# Patient Record
Sex: MALE | Race: WHITE | NOT HISPANIC OR LATINO | Employment: FULL TIME | URBAN - NONMETROPOLITAN AREA
[De-identification: names, ages, dates, MRNs, and addresses within clinical notes are randomized per-mention and may not be internally consistent; named-entity substitution may affect disease eponyms.]

---

## 2017-03-11 ENCOUNTER — HOSPITAL ENCOUNTER (EMERGENCY)
Facility: HOSPITAL | Age: 67
Discharge: HOME | End: 2017-03-11
Attending: PHYSICIAN ASSISTANT | Admitting: PHYSICIAN ASSISTANT
Payer: COMMERCIAL

## 2017-03-11 VITALS — RESPIRATION RATE: 16 BRPM | TEMPERATURE: 98.8 F | OXYGEN SATURATION: 95 %

## 2017-03-11 DIAGNOSIS — J20.8 ACUTE BRONCHITIS DUE TO OTHER SPECIFIED ORGANISMS: ICD-10-CM

## 2017-03-11 PROCEDURE — 99213 OFFICE O/P EST LOW 20 MIN: CPT | Performed by: PHYSICIAN ASSISTANT

## 2017-03-11 PROCEDURE — 99213 OFFICE O/P EST LOW 20 MIN: CPT

## 2017-03-11 RX ORDER — AZITHROMYCIN 250 MG/1
TABLET, FILM COATED ORAL
Qty: 6 TABLET | Refills: 0 | Status: SHIPPED | OUTPATIENT
Start: 2017-03-11 | End: 2018-01-10

## 2017-03-11 ASSESSMENT — ENCOUNTER SYMPTOMS
COUGH: 1
FATIGUE: 1
NAUSEA: 0
LIGHT-HEADEDNESS: 0
APPETITE CHANGE: 0
VOMITING: 0
PSYCHIATRIC NEGATIVE: 1
VOICE CHANGE: 0
FEVER: 0
HEADACHES: 0
NECK STIFFNESS: 0
ABDOMINAL PAIN: 0
DIZZINESS: 0
DIARRHEA: 0
TROUBLE SWALLOWING: 0
NECK PAIN: 0
SORE THROAT: 0
SINUS PRESSURE: 0
EYE DISCHARGE: 0
CARDIOVASCULAR NEGATIVE: 1
EYE REDNESS: 0

## 2017-03-11 NOTE — ED AVS SNAPSHOT
[facility]    [address] [address]    Phone:  [phone]                                       Dino Guzman   MRN: [id number]    Department:  [facility]   Date of Visit:  3/11/2017           Patient Information     Date Of Birth          1950        Your diagnoses for this visit were:     Acute bronchitis due to other specified organisms Cough 4-6 weeks       You were seen by Gretel Sood PA.      Follow-up Information     Please follow up.    Why:  If symptoms worsen, with a primary care provider        Follow up with [facility].    Specialty:  EMERGENCY MEDICINE    Why:  If further concerns develop    Contact information:    [address] [address] [phone]    Additional information:    From [facility]: Take [address]. Turn left at [address]. Turn left at the first stoplight on [address]. At the first stop sign, take a right onto [address]. Take a left into the parking lot and continue through until you reach the North enterance of the building.       From [place]: Take [address]. Take the [address] ramp towards [place]. Turn left onto [address]. Take a slight right onto [address]. Turn left at the first stoplight on [address]. At the first stop sign, take a right onto [address]. Take a left into the parking lot and continue through until you reach the North enterance of the building.       From [place]: Take [address]. Take a right at [address]. At the first stop sign, take a right onto [address]. Take a left into the parking lot and continue through until you reach the North enterance of the building.       Discharge References/Attachments     BRONCHITIS, ANTIOBIOTIC TREATMENT (ADULT) (ENGLISH)         Review of your medicines      START taking        Dose / Directions Last dose taken    azithromycin 250  "MG tablet   Commonly known as:  ZITHROMAX   Quantity:  6 tablet        2 tabs today then one daily for the next 4 days   Refills:  0                Prescriptions were sent or printed at these locations (1 Prescription)                   [facility] [id number] [place] - [address] AT [facility] [address]   [address] [address]    Telephone:  [phone]   Fax:  [phone]   Hours:                  E-Prescribed (1 of 1)         azithromycin (ZITHROMAX) 250 MG tablet                Orders Needing Specimen Collection     None      Pending Results     No orders found from 3/9/2017 to 3/12/2017.            Pending Culture Results     No orders found from 3/9/2017 to 3/12/2017.            Thank you for choosing [facility]       Thank you for choosing [facility] for your care. Our goal is always to provide you with excellent care. Hearing back from our patients is one way we can continue to improve our services. Please take a few minutes to complete the written survey that you may receive in the mail after you visit with us. Thank you!        [employer] Information     [employer] lets you send messages to your doctor, view your test results, renew your prescriptions, schedule appointments and more. To sign up, go to [email or web address] . Click on \"Log in\" on the left side of the screen, which will take you to the Welcome page. Then click on \"Sign up Now\" on the right side of the page.     You will be asked to enter the access code listed below, as well as some personal information. Please follow the directions to create your username and password.     Your access code is: [email or web address]  Expires: 2017 10:31 AM     Your access code will  in 90 days. If you need help or a new code, please call your [facility] clinic or [phone].        Care EveryWhere ID     This is your Care EveryWhere ID. This could be used by other organizations to access your [facility] medical records  [id number]   "      After Visit Summary       This is your record. Keep this with you and show to your community pharmacist(s) and doctor(s) at your next visit.

## 2017-03-11 NOTE — ED AVS SNAPSHOT
[facility]    [address] [place] [address]    Phone:  [phone]                                       Dino Guzman   MRN: [id number]    Department:  [facility]   Date of Visit:  3/11/2017           After Visit Summary Signature Page     I have received my discharge instructions, and my questions have been answered. I have discussed any challenges I see with this plan with the nurse or doctor.    ..........................................................................................................................................  Patient/Patient Representative Signature      ..........................................................................................................................................  Patient Representative Print Name and Relationship to Patient    ..................................................               ................................................  Date                                            Time    ..........................................................................................................................................  Reviewed by Signature/Title    ...................................................              ..............................................  Date                                                            Time

## 2017-03-11 NOTE — ED PROVIDER NOTES
History     Chief Complaint   Patient presents with     Cough     started 3-4 days ago, some runny nose     The history is provided by the patient. No  was used.     Dino Guzman is a 66 year old male who has cough/congestion x 3-4 weeks. Has some decreased energy. Denies n/v/d/f/c. No ear pain. No sinus pain/pressure. No sore throat    Allergies as of 03/11/2017     (No Known Allergies)     Discharge Medication List as of 3/11/2017 10:31 AM      START taking these medications    Details   azithromycin (ZITHROMAX) 250 MG tablet 2 tabs today then one daily for the next 4 days, Disp-6 tablet, R-0, E-Prescribe             PMH: not pertinent  PSH: not pertinent  FHX: not pertinent        Social History     Social History     Marital status:      Spouse name: N/A     Number of children: N/A     Years of education: N/A     Social History Main Topics     Smoking status: None     Smokeless tobacco: None     Alcohol use None     Drug use: None     Sexual activity: Not Asked     Other Topics Concern     None     Social History Narrative     None         I have reviewed the Medications, Allergies, Past Medical and Surgical History, and Social History in the Epic system.    Review of Systems   Constitutional: Positive for fatigue. Negative for appetite change and fever.   HENT: Positive for congestion. Negative for ear pain, sinus pressure, sore throat, trouble swallowing and voice change.    Eyes: Negative for discharge and redness.   Respiratory: Positive for cough.    Cardiovascular: Negative.    Gastrointestinal: Negative for abdominal pain, diarrhea, nausea and vomiting.   Genitourinary: Negative.    Musculoskeletal: Negative for neck pain and neck stiffness.   Skin: Negative for rash.   Neurological: Negative for dizziness, light-headedness and headaches.   Psychiatric/Behavioral: Negative.        Physical Exam   Heart Rate: 96  Temp: 98.8  F (37.1  C)  Resp: 16  SpO2: 95 %  Physical Exam    Constitutional: He is oriented to person, place, and time. He appears well-developed and well-nourished. No distress.   HENT:   Head: Normocephalic and atraumatic.   Right Ear: External ear normal.   Left Ear: External ear normal.   Mouth/Throat: Oropharynx is clear and moist.   Bilateral TMs/canals clear/wnl  No sinus TTP     Eyes: Conjunctivae and EOM are normal. Right eye exhibits no discharge. Left eye exhibits no discharge.   Neck: Normal range of motion. Neck supple.   Cardiovascular: Normal rate, regular rhythm and normal heart sounds.    Pulmonary/Chest: Effort normal. No respiratory distress.   Abdominal: Soft. Bowel sounds are normal. He exhibits no distension. There is no tenderness.   Neurological: He is alert and oriented to person, place, and time.   Skin: Skin is warm and dry. No rash noted. He is not diaphoretic.   Psychiatric: He has a normal mood and affect.   Nursing note and vitals reviewed.      ED Course     ED Course     Procedures          Assessments & Plan (with Medical Decision Making)     I have reviewed the nursing notes.    I have reviewed the findings, diagnosis, plan and need for follow up with the patient.    Discharge Medication List as of 3/11/2017 10:31 AM      START taking these medications    Details   azithromycin (ZITHROMAX) 250 MG tablet 2 tabs today then one daily for the next 4 days, Disp-6 tablet, R-0, E-Prescribe         Due to length og s/s I chose to tx    Final diagnoses:   Acute bronchitis due to other specified organisms - Cough 4-6 weeks           Patient verbally educated and given appropriate education sheets for each of the diagnoses and has no questions.  Take OTC motrin or tylenol as directed on the bottle as needed.  Take prescription medications as directed.  Increase fluids, wash hands often.  Sleep in a recliner or with multiple pillows until this has resolved.  Follow up with your provider if symptoms increase or if concerns develop, return to the  CHRISTELLE Sood Certified   Physician Assistant  3/11/2017  3:24 PM  URGENT CARE CLINIC    3/11/2017   [facility]     Gretel Sood PA  03/11/17 1528

## 2018-01-10 ENCOUNTER — HOSPITAL ENCOUNTER (EMERGENCY)
Facility: HOSPITAL | Age: 68
Discharge: HOME | End: 2018-01-10
Attending: NURSE PRACTITIONER | Admitting: NURSE PRACTITIONER
Payer: COMMERCIAL

## 2018-01-10 ENCOUNTER — APPOINTMENT (OUTPATIENT)
Dept: GENERAL RADIOLOGY | Facility: HOSPITAL | Age: 68
End: 2018-01-10
Attending: NURSE PRACTITIONER
Payer: COMMERCIAL

## 2018-01-10 VITALS
OXYGEN SATURATION: 97 % | TEMPERATURE: 97.5 F | HEIGHT: 67 IN | RESPIRATION RATE: 16 BRPM | SYSTOLIC BLOOD PRESSURE: 158 MMHG | DIASTOLIC BLOOD PRESSURE: 80 MMHG

## 2018-01-10 DIAGNOSIS — M54.41 ACUTE RIGHT-SIDED LOW BACK PAIN WITH RIGHT-SIDED SCIATICA: ICD-10-CM

## 2018-01-10 PROCEDURE — 72100 X-RAY EXAM L-S SPINE 2/3 VWS: CPT | Mod: TC

## 2018-01-10 PROCEDURE — 96372 THER/PROPH/DIAG INJ SC/IM: CPT

## 2018-01-10 PROCEDURE — 25000128 H RX IP 250 OP 636: Performed by: NURSE PRACTITIONER

## 2018-01-10 PROCEDURE — G0463 HOSPITAL OUTPT CLINIC VISIT: HCPCS | Mod: 25

## 2018-01-10 PROCEDURE — 99213 OFFICE O/P EST LOW 20 MIN: CPT | Performed by: NURSE PRACTITIONER

## 2018-01-10 RX ORDER — PREDNISONE 20 MG/1
TABLET ORAL
Qty: 10 TABLET | Refills: 0 | Status: SHIPPED | OUTPATIENT
Start: 2018-01-10 | End: 2018-02-16

## 2018-01-10 RX ORDER — KETOROLAC TROMETHAMINE 30 MG/ML
30 INJECTION, SOLUTION INTRAMUSCULAR; INTRAVENOUS ONCE
Status: COMPLETED | OUTPATIENT
Start: 2018-01-10 | End: 2018-01-10

## 2018-01-10 RX ORDER — CYCLOBENZAPRINE HCL 10 MG
10 TABLET ORAL 3 TIMES DAILY PRN
Qty: 15 TABLET | Refills: 0 | Status: SHIPPED | OUTPATIENT
Start: 2018-01-10 | End: 2018-02-16

## 2018-01-10 RX ORDER — DEXAMETHASONE SODIUM PHOSPHATE 10 MG/ML
10 INJECTION, SOLUTION INTRAMUSCULAR; INTRAVENOUS ONCE
Status: COMPLETED | OUTPATIENT
Start: 2018-01-10 | End: 2018-01-10

## 2018-01-10 RX ORDER — TRAMADOL HYDROCHLORIDE 50 MG/1
50 TABLET ORAL EVERY 6 HOURS PRN
Qty: 10 TABLET | Refills: 0 | Status: SHIPPED | OUTPATIENT
Start: 2018-01-10 | End: 2018-02-16

## 2018-01-10 RX ADMIN — KETOROLAC TROMETHAMINE 30 MG: 30 INJECTION, SOLUTION INTRAMUSCULAR at 12:26

## 2018-01-10 RX ADMIN — DEXAMETHASONE SODIUM PHOSPHATE 10 MG: 10 INJECTION, SOLUTION INTRAMUSCULAR; INTRAVENOUS at 12:28

## 2018-01-10 ASSESSMENT — ENCOUNTER SYMPTOMS
VOMITING: 0
ABDOMINAL PAIN: 0
ACTIVITY CHANGE: 1
TROUBLE SWALLOWING: 0
FEVER: 0
CHILLS: 0
APPETITE CHANGE: 0
NUMBNESS: 0
PSYCHIATRIC NEGATIVE: 1
DYSURIA: 0
COUGH: 0
DIARRHEA: 0
NAUSEA: 0
BACK PAIN: 1
WEAKNESS: 0

## 2018-01-10 NOTE — ED AVS SNAPSHOT
[facility]    [address] [address]    Phone:  [phone]                                       Dino Guzman   MRN: [id number]    Department:  [facility]   Date of Visit:  1/10/2018           Patient Information     Date Of Birth          1950        Your diagnoses for this visit were:     Acute right-sided low back pain with right-sided sciatica        You were seen by Lissy Preston NP.      Follow-up Information     Follow up with [facility].    Specialty:  EMERGENCY MEDICINE    Why:  As needed, If symptoms worsen    Contact information:    [address] [place] [address] [phone]    Additional information:    From [place]: Take [address]. Turn left at [address]. Turn left at the first stoplight on [address] [place] [address]. At the first stop sign, take a right onto [address]. Take a left into the parking lot and continue through until you reach the North enterance of the building.       From [place]: Take [address]. Take the [address] ramp towards [place]. Turn left onto [address]. Take a slight right onto [address]. Turn left at the first stoplight on [address]. At the first stop sign, take a right onto [address]. Take a left into the parking lot and continue through until you reach the North enterance of the building.       From [place]: Take [address]. Take a right at [address]. At the first stop sign, take a right onto [address]. Take a left into the parking lot and continue through until you reach the North enterance of the building.         Discharge Instructions       Take prednisone as directed. Start tomorrow and take in the morning. Do not take NSAIDS (ibuprofen, aspirin, etc..) while taking.   Take Tramadol for pain not helped by tylenol. Do not drive or participate in activities that require alertness when  taking.   Take Flexeril as needed as directed for muscle spasms. Do not drive or participate in activities that require alertness when taking.   Apply ice to right side of back for 20 minutes every 1-2 hours. Protect skin.   Return to urgent care or emergency department with any increase in symptoms or concerns.     Discharge References/Attachments     BACK PAIN W/ SCIATICA (ENGLISH)         Review of your medicines      START taking        Dose / Directions Last dose taken    cyclobenzaprine 10 MG tablet   Commonly known as:  FLEXERIL   Dose:  10 mg   Quantity:  15 tablet        Take 1 tablet (10 mg) by mouth 3 times daily as needed for muscle spasms   Refills:  0        predniSONE 20 MG tablet   Commonly known as:  DELTASONE   Quantity:  10 tablet        Take two tablets (= 40mg) each day for 5 (five) days   Refills:  0        traMADol 50 MG tablet   Commonly known as:  ULTRAM   Dose:  50 mg   Quantity:  10 tablet        Take 1 tablet (50 mg) by mouth every 6 hours as needed for pain maximum 4 tablet(s) per day   Refills:  0          Our records show that you are taking the medicines listed below. If these are incorrect, please call your family doctor or clinic.        Dose / Directions Last dose taken    TYLENOL PO   Dose:  1500 mg        Take 1,500 mg by mouth   Refills:  0                Prescriptions were sent or printed at these locations (3 Prescriptions)                   [facility] [address] [facility] [address] AT [facility] [address]   [address] [facility] [address]    Telephone:  [phone]   Fax:  [phone]   Hours:                  E-Prescribed (2 of 3)         predniSONE (DELTASONE) 20 MG tablet               cyclobenzaprine (FLEXERIL) 10 MG tablet                 Printed at Department/Unit printer (1 of 3)         traMADol (ULTRAM) 50 MG tablet                Procedures and tests performed during your visit     Lumbar spine XR, 2-3 views      Orders Needing Specimen  "Collection     None      Pending Results     No orders found from 2018 to 2018.            Pending Culture Results     No orders found from 2018 to 2018.            Thank you for choosing [facility]       Thank you for choosing [facility] for your care. Our goal is always to provide you with excellent care. Hearing back from our patients is one way we can continue to improve our services. Please take a few minutes to complete the written survey that you may receive in the mail after you visit with us. Thank you!        [employer] Information     [employer] lets you send messages to your doctor, view your test results, renew your prescriptions, schedule appointments and more. To sign up, go to [email or web address] . Click on \"Log in\" on the left side of the screen, which will take you to the Welcome page. Then click on \"Sign up Now\" on the right side of the page.     You will be asked to enter the access code listed below, as well as some personal information. Please follow the directions to create your username and password.     Your access code is: [email or web address]  Expires: 4/10/2018 12:32 PM     Your access code will  in 90 days. If you need help or a new code, please call your [facility] clinic or [phone].        Care EveryWhere ID     This is your Care EveryWhere ID. This could be used by other organizations to access your [facility] medical records  [id number]        Equal Access to Services     CHRISS NICHOLSON : Hadii layton barneso Soneel, waaxda luqadaha, qaybta kaalmada adeegyada, abner coates . So [facility] [phone].    ATENCIÓN: Si habla español, tiene a rodriguez disposición servicios gratuitos de asistencia lingüística. Llame al [phone].    We comply with applicable federal civil rights laws and [place] laws. We do not discriminate on the basis of race, color, national origin, age, disability, sex, sexual orientation, or gender identity.            After Visit " Summary       This is your record. Keep this with you and show to your community pharmacist(s) and doctor(s) at your next visit.

## 2018-01-10 NOTE — DISCHARGE INSTRUCTIONS
Take prednisone as directed. Start tomorrow and take in the morning. Do not take NSAIDS (ibuprofen, aspirin, etc..) while taking.   Take Tramadol for pain not helped by tylenol. Do not drive or participate in activities that require alertness when taking.   Take Flexeril as needed as directed for muscle spasms. Do not drive or participate in activities that require alertness when taking.   Apply ice to right side of back for 20 minutes every 1-2 hours. Protect skin.   Return to urgent care or emergency department with any increase in symptoms or concerns.

## 2018-01-10 NOTE — ED AVS SNAPSHOT
[facility]    [address] [place] [address]    Phone:  [phone]                                       Dino Guzman   MRN: [id number]    Department[facility]  [facility]   Date of Visit:  1/10/2018           After Visit Summary Signature Page     I have received my discharge instructions, and my questions have been answered. I have discussed any challenges I see with this plan with the nurse or doctor.    ..........................................................................................................................................  Patient/Patient Representative Signature      ..........................................................................................................................................  Patient Representative Print Name and Relationship to Patient    ..................................................               ................................................  Date                                            Time    ..........................................................................................................................................  Reviewed by Signature/Title    ...................................................              ..............................................  Date                                                            Time

## 2018-01-10 NOTE — ED NOTES
Pt presents today alone for c/o low back pain that radiates down the right leg, for about a week.

## 2018-01-10 NOTE — ED PROVIDER NOTES
History     Chief Complaint   Patient presents with     Back Pain     For 10 days, becoming worse. Radiates down right leg to calf area.     The history is provided by the patient. No  was used.     Dino Guzman is a 67 year old male who presents with right sided back pain with radiation to right thigh that started 10 days. He fell 2-3 weeks ago and isn't sure if that caused the flare up. He's had sciatica pain prior and his symptoms feel similar. He wears insoles in his shoes and when he took them out, he noticed an increase in pain. He's taken tylenol without effectiveness. Denies fever, chills, or night sweats. Eating and drinking well. Bowel and bladder are working well. Denies loss of bowel or bladder control. Denies saddle parathesis. Denies diabetes.       Problem List:    There are no active problems to display for this patient.       Past Medical History:    History reviewed. No pertinent past medical history.    Past Surgical History:    History reviewed. No pertinent surgical history.    Family History:    No family history on file.    Social History:  Marital Status:   [2]  Social History   Substance Use Topics     Smoking status: Not on file     Smokeless tobacco: Not on file     Alcohol use Not on file        Medications:      Acetaminophen (TYLENOL PO)   predniSONE (DELTASONE) 20 MG tablet   cyclobenzaprine (FLEXERIL) 10 MG tablet   traMADol (ULTRAM) 50 MG tablet         Review of Systems   Constitutional: Positive for activity change. Negative for appetite change, chills and fever.   HENT: Negative for congestion and trouble swallowing.    Respiratory: Negative for cough.    Cardiovascular: Negative for chest pain and leg swelling.   Gastrointestinal: Negative for abdominal pain, diarrhea, nausea and vomiting.   Genitourinary: Negative for dysuria.   Musculoskeletal: Positive for back pain.        Low back pain with radiation to right thigh.    Skin: Negative for  "rash.   Neurological: Negative for weakness and numbness.   Psychiatric/Behavioral: Negative.        Physical Exam   BP: (!) 198/100  Heart Rate: 106  Temp: 97.5  F (36.4  C)  Resp: 16  Height: 170.2 cm (5' 7\")  SpO2: 97 %    Manual /80 when roomed urgent care.     Physical Exam   Constitutional: He is oriented to person, place, and time. He appears well-developed and well-nourished. No distress.   HENT:   Head: Normocephalic.   Mouth/Throat: Oropharynx is clear and moist.   Neck: Normal range of motion. Neck supple.   Cardiovascular: Normal rate, regular rhythm, normal heart sounds and intact distal pulses.    No murmur heard.  Pulmonary/Chest: Effort normal. No respiratory distress. He has no wheezes. He has no rales.   Abdominal: Soft. He exhibits no distension.   Musculoskeletal: He exhibits tenderness. He exhibits no edema or deformity.   CMS and ROM intact to bilateral lower extremities. Distal pulses intact. NO step offs or TTP to spinal column. Pain is reproducible at right lateral S1 region. Right straight leg intact.    Lymphadenopathy:     He has no cervical adenopathy.   Neurological: He is alert and oriented to person, place, and time. He displays normal reflexes. He exhibits normal muscle tone. Coordination normal.   Skin: Skin is warm and dry. No rash noted. He is not diaphoretic. No erythema.   No rash, erythema, bruising, or warmth to touch to posterior back.    Psychiatric: He has a normal mood and affect. His behavior is normal.   Nursing note and vitals reviewed.      ED Course     ED Course     Procedures    I personally reviewed the x-rays and there is NO fracture or dislocation. Radiology review pending and nurse will notify patient if there is any change in the treatment plan.    Results for orders placed or performed during the hospital encounter of 01/10/18   Lumbar spine XR, 2-3 views    Narrative    XR LUMBAR SPINE 2-3 VIEWS    HISTORY: Pain; , .    TECHNIQUE: 3 views of the " lumbosacral spine.    COMPARISON: None.    FINDINGS:    Lumbar vertebral body heights are preserved. Lumbar lordosis is  straightened. There are bulky ventral lateral osteophytes at multiple  levels, particularly L1-2 and L2-3. Mild diffuse degenerative disc  height loss is present. There is degenerative facet disease, moderate  to advanced at L4-5 and L5-S1. There is right greater than left SI  joint degeneration.        Impression    IMPRESSION:     Multifocal degenerative changes without evidence of acute fracture.    YASMIN WALLACE MD     Medications   ketorolac (TORADOL) injection 30 mg (30 mg Intramuscular Given 1/10/18 1226)   dexamethasone (DECADRON) injection 10 mg (10 mg Intramuscular Given 1/10/18 1228)     Monitored for 20 minutes and tolerated well.     Assessments & Plan (with Medical Decision Making)     Discussed plan of care. He verbalized understanding. All questions answered.     I have reviewed the nursing notes.    I have reviewed the findings, diagnosis, plan and need for follow up with the patient.  Discharged in stable condition.     New Prescriptions    CYCLOBENZAPRINE (FLEXERIL) 10 MG TABLET    Take 1 tablet (10 mg) by mouth 3 times daily as needed for muscle spasms    PREDNISONE (DELTASONE) 20 MG TABLET    Take two tablets (= 40mg) each day for 5 (five) days    TRAMADOL (ULTRAM) 50 MG TABLET    Take 1 tablet (50 mg) by mouth every 6 hours as needed for pain maximum 4 tablet(s) per day       Final diagnoses:   Acute right-sided low back pain with right-sided sciatica     Take prednisone as directed. Start tomorrow and take in the morning. Do not take NSAIDS (ibuprofen, aspirin, etc..) while taking.   Take Tramadol for pain not helped by tylenol. Do not drive or participate in activities that require alertness when taking.   Take Flexeril as needed as directed for muscle spasms. Do not drive or participate in activities that require alertness when taking.   Apply ice to right side of  back for 20 minutes every 1-2 hours. Protect skin.   Return to urgent care or emergency department with any increase in symptoms or concerns.     JEAN-PAUL Fabian  1/10/2018  11:36 AM  URGENT CARE CLINIC       Lissy Preston NP  01/10/18 0093

## 2018-02-16 ENCOUNTER — HOSPITAL ENCOUNTER (EMERGENCY)
Facility: HOSPITAL | Age: 68
Discharge: HOME | End: 2018-02-16
Attending: NURSE PRACTITIONER | Admitting: NURSE PRACTITIONER
Payer: COMMERCIAL

## 2018-02-16 VITALS
DIASTOLIC BLOOD PRESSURE: 59 MMHG | WEIGHT: 270 LBS | HEART RATE: 97 BPM | HEIGHT: 67 IN | OXYGEN SATURATION: 93 % | RESPIRATION RATE: 20 BRPM | BODY MASS INDEX: 42.38 KG/M2 | SYSTOLIC BLOOD PRESSURE: 168 MMHG | TEMPERATURE: 97.8 F

## 2018-02-16 DIAGNOSIS — B02.23 SHINGLES (HERPES ZOSTER) POLYNEUROPATHY: ICD-10-CM

## 2018-02-16 PROCEDURE — G0463 HOSPITAL OUTPT CLINIC VISIT: HCPCS

## 2018-02-16 PROCEDURE — 99213 OFFICE O/P EST LOW 20 MIN: CPT | Performed by: NURSE PRACTITIONER

## 2018-02-16 RX ORDER — VALACYCLOVIR HYDROCHLORIDE 1 G/1
1000 TABLET, FILM COATED ORAL 3 TIMES DAILY
Qty: 21 TABLET | Refills: 0 | Status: SHIPPED | OUTPATIENT
Start: 2018-02-16 | End: 2020-03-16

## 2018-02-16 NOTE — ED PROVIDER NOTES
"  History     Chief Complaint   Patient presents with     Rash     Possibly shingles type on Chest, closed and dry X 5 days     The history is provided by the patient. No  was used.     Dino Guzman is a 67 year old male who presents with a rash to the left side of his chest that started 5 days ago. Rash has a burning characteristic. No interventions for symptoms. Denies fever, chills, or night sweats. Eating and drinking well. Bowel and bladder are working well.  He had chicken pox as a child. He hasn't received Zoster vaccine.     He's had shingles 1 time.     Problem List:    There are no active problems to display for this patient.       Past Medical History:    History reviewed. No pertinent past medical history.    Past Surgical History:    History reviewed. No pertinent surgical history.    Family History:    No family history on file.    Social History:  Marital Status:   [2]  Social History   Substance Use Topics     Smoking status: Never Smoker     Smokeless tobacco: Never Used     Alcohol use Not on file        Medications:      valACYclovir (VALTREX) 1000 mg tablet   Acetaminophen (TYLENOL PO)         Review of Systems   Constitutional: Negative for activity change, appetite change, chills, fatigue and fever.   HENT: Negative for congestion and trouble swallowing.    Respiratory: Negative for cough, shortness of breath and stridor.    Gastrointestinal: Negative for abdominal pain, diarrhea, nausea and vomiting.   Genitourinary: Negative for dysuria.   Skin: Positive for rash.        Rash to left side of chest.    Neurological: Negative for weakness and numbness.   Psychiatric/Behavioral: Negative.        Physical Exam   BP: 168/59  Pulse: 97  Temp: 97.8  F (36.6  C)  Resp: 20  Height: 170.2 cm (5' 7\")  Weight: 122.5 kg (270 lb)  SpO2: 93 %      Physical Exam   Constitutional: He is oriented to person, place, and time. He appears well-developed and well-nourished. No " distress.   HENT:   Head: Normocephalic.   Right Ear: External ear normal.   Left Ear: External ear normal.   Mouth/Throat: Oropharynx is clear and moist.   Neck: Normal range of motion. Neck supple.   Cardiovascular: Normal rate, regular rhythm and normal heart sounds.    No murmur heard.  Pulmonary/Chest: Effort normal. No respiratory distress. He has no wheezes. He has no rales.   Abdominal: Soft. He exhibits no distension.   Musculoskeletal: Normal range of motion.   Lymphadenopathy:     He has no cervical adenopathy.   Neurological: He is alert and oriented to person, place, and time. He exhibits normal muscle tone.   Skin: Skin is warm and dry. Rash noted. He is not diaphoretic.   Diffuse clustered vesicular rash to left T3-T4 region on anterior and posterior. No drainage from rash. Rash does not cross midline.    Psychiatric: He has a normal mood and affect. His behavior is normal.   Nursing note and vitals reviewed.      ED Course     ED Course     Procedures      Assessments & Plan (with Medical Decision Making)     Discussed plan of care. He verbalized understanding. All questions answered.     I have reviewed the nursing notes.    I have reviewed the findings, diagnosis, plan and need for follow up with the patient.  Discharged in stable condition.     New Prescriptions    VALACYCLOVIR (VALTREX) 1000 MG TABLET    Take 1 tablet (1,000 mg) by mouth 3 times daily for 7 days       Final diagnoses:   Shingles (herpes zoster) polyneuropathy     Take Valtrex as ordered.   Take tylenol and/or ibuprofen for pain. Follow dosing on package.   You can apply a cooling lotion like Vanicream to rash if symptoms are present.   Follow up with PCP with any increase in symptoms or concerns.   Return to urgent care or emergency department with any increase in symptoms or concerns.     JEAN-PAUL Fabian  2/16/2018  4:22 PM  [facility]       Lissy Preston NP  02/18/18 8509

## 2018-02-16 NOTE — ED AVS SNAPSHOT
[facility]    [address] [address]    Phone:  [phone]                                       Dino Guzman   MRN: [id number]    Department:  [facility]   Date of Visit:  2/16/2018           Patient Information     Date Of Birth          1950        Your diagnoses for this visit were:     Shingles (herpes zoster) polyneuropathy        You were seen by Lissy Preston NP.      Follow-up Information     Follow up with [facility].    Specialty:  EMERGENCY MEDICINE    Why:  As needed, If symptoms worsen    Contact information:    [address] [address] [phone]    Additional information:    From [place]: Take [address]. Turn left at [address]. Turn left at the first stoplight on [address]. At the first stop sign, take a right onto [address]. Take a left into the parking lot and continue through until you reach the North enterance of the building.       From [place]: Take [address]. Take the [address] ramp towards [place]. Turn left onto [address]. Take a slight right onto [address]. Turn left at the first stoplight on [address]. At the first stop sign, take a right onto [address]. Take a left into the parking lot and continue through until you reach the North enterance of the building.       From [place]: Take [address]. Take a right at [address]. At the first stop sign, take a right onto [address]. Take a left into the parking lot and continue through until you reach the North enterance of the building.         Discharge Instructions       Take Valtrex as ordered.   Take tylenol and/or ibuprofen for pain. Follow dosing on package.   You can apply a cooling lotion like Vanicream to rash if symptoms are present.   Follow up with PCP with any increase in symptoms or concerns.   Return to urgent care or  "emergency department with any increase in symptoms or concerns.     Discharge References/Attachments     SHINGLES (HERPES ZOSTER) (ENGLISH)         Review of your medicines      START taking        Dose / Directions Last dose taken    valACYclovir 1000 mg tablet   Commonly known as:  VALTREX   Dose:  1000 mg   Quantity:  21 tablet        Take 1 tablet (1,000 mg) by mouth 3 times daily for 7 days   Refills:  0          Our records show that you are taking the medicines listed below. If these are incorrect, please call your family doctor or clinic.        Dose / Directions Last dose taken    TYLENOL PO   Dose:  1500 mg        Take 1,500 mg by mouth   Refills:  0                Prescriptions were sent or printed at these locations (1 Prescription)                   [id number] - [place], [place] - [address] AT [facility] [address]   [address], [place] [address]    Telephone:  [phone]   Fax:  [phone]   Hours:                  E-Prescribed (1 of 1)         valACYclovir (VALTREX) 1000 mg tablet                Orders Needing Specimen Collection     None      Pending Results     No orders found from 2/14/2018 to 2/17/2018.            Pending Culture Results     No orders found from 2/14/2018 to 2/17/2018.            Thank you for choosing [facility]       Thank you for choosing [facility] for your care. Our goal is always to provide you with excellent care. Hearing back from our patients is one way we can continue to improve our services. Please take a few minutes to complete the written survey that you may receive in the mail after you visit with us. Thank you!        [facility] Information     [facility] lets you send messages to your doctor, view your test results, renew your prescriptions, schedule appointments and more. To sign up, go to [email or web address] . Click on \"Log in\" on the left side of the screen, which will take you to the Welcome page. Then click on \"Sign up Now\" on the right " side of the page.     You will be asked to enter the access code listed below, as well as some personal information. Please follow the directions to create your username and password.     Your access code is: [email or web address]  Expires: 4/10/2018 12:32 PM     Your access code will  in 90 days. If you need help or a new code, please call your [facility] clinic or [phone].        Care EveryWhere ID     This is your Care EveryWhere ID. This could be used by other organizations to access your [facility] medical records  [id number]        Equal Access to Services     [facility]: Hadkatharine Silva, breanne triana, wiliam bocanegra, abner coates . So [facility] [phone].    ATENCIÓN: Si habla español, tiene a rodriguez disposición servicios gratuitos de asistencia lingüística. Llame al [phone].    We comply with applicable federal civil rights laws and [place] laws. We do not discriminate on the basis of race, color, national origin, age, disability, sex, sexual orientation, or gender identity.            After Visit Summary       This is your record. Keep this with you and show to your community pharmacist(s) and doctor(s) at your next visit.

## 2018-02-16 NOTE — DISCHARGE INSTRUCTIONS
Take Valtrex as ordered.   Take tylenol and/or ibuprofen for pain. Follow dosing on package.   You can apply a cooling lotion like Vanicream to rash if symptoms are present.   Follow up with PCP with any increase in symptoms or concerns.   Return to urgent care or emergency department with any increase in symptoms or concerns.

## 2018-02-16 NOTE — ED AVS SNAPSHOT
[facility]    [address] [place] [address]    Phone:  [phone]                                       Dino Guzman   MRN: [id number]    Department:  [facility]   Date of Visit:  2/16/2018           After Visit Summary Signature Page     I have received my discharge instructions, and my questions have been answered. I have discussed any challenges I see with this plan with the nurse or doctor.    ..........................................................................................................................................  Patient/Patient Representative Signature      ..........................................................................................................................................  Patient Representative Print Name and Relationship to Patient    ..................................................               ................................................  Date                                            Time    ..........................................................................................................................................  Reviewed by Signature/Title    ...................................................              ..............................................  Date                                                            Time

## 2018-02-18 ASSESSMENT — ENCOUNTER SYMPTOMS
ACTIVITY CHANGE: 0
COUGH: 0
CHILLS: 0
DYSURIA: 0
DIARRHEA: 0
NUMBNESS: 0
PSYCHIATRIC NEGATIVE: 1
FEVER: 0
ABDOMINAL PAIN: 0
TROUBLE SWALLOWING: 0
APPETITE CHANGE: 0
VOMITING: 0
SHORTNESS OF BREATH: 0
STRIDOR: 0
FATIGUE: 0
WEAKNESS: 0
NAUSEA: 0

## 2020-03-16 ENCOUNTER — HOSPITAL ENCOUNTER (OUTPATIENT)
Dept: MRI IMAGING | Facility: HOSPITAL | Age: 70
End: 2020-03-16
Attending: PHYSICIAN ASSISTANT
Payer: COMMERCIAL

## 2020-03-16 ENCOUNTER — HOSPITAL ENCOUNTER (EMERGENCY)
Facility: HOSPITAL | Age: 70
Discharge: HOME | End: 2020-03-16
Attending: PHYSICIAN ASSISTANT
Payer: COMMERCIAL

## 2020-03-16 ENCOUNTER — APPOINTMENT (OUTPATIENT)
Dept: GENERAL RADIOLOGY | Facility: HOSPITAL | Age: 70
End: 2020-03-16
Attending: PHYSICIAN ASSISTANT
Payer: COMMERCIAL

## 2020-03-16 VITALS
TEMPERATURE: 97 F | OXYGEN SATURATION: 98 % | RESPIRATION RATE: 18 BRPM | DIASTOLIC BLOOD PRESSURE: 104 MMHG | SYSTOLIC BLOOD PRESSURE: 170 MMHG

## 2020-03-16 DIAGNOSIS — M25.562 ACUTE PAIN OF LEFT KNEE: ICD-10-CM

## 2020-03-16 LAB
ANION GAP SERPL CALCULATED.3IONS-SCNC: 6 MMOL/L (ref 3–14)
BUN SERPL-MCNC: 21 MG/DL (ref 7–30)
CALCIUM SERPL-MCNC: 9.7 MG/DL (ref 8.5–10.1)
CHLORIDE SERPL-SCNC: 107 MMOL/L (ref 94–109)
CO2 SERPL-SCNC: 28 MMOL/L (ref 20–32)
CREAT SERPL-MCNC: 1 MG/DL (ref 0.66–1.25)
GFR SERPL CREATININE-BSD FRML MDRD: 77 ML/MIN/{1.73_M2}
GLUCOSE SERPL-MCNC: 146 MG/DL (ref 70–99)
POTASSIUM SERPL-SCNC: 4.4 MMOL/L (ref 3.4–5.3)
SODIUM SERPL-SCNC: 141 MMOL/L (ref 133–144)

## 2020-03-16 PROCEDURE — 70200 X-RAY EXAM OF EYE SOCKETS: CPT | Mod: TC

## 2020-03-16 PROCEDURE — 73562 X-RAY EXAM OF KNEE 3: CPT | Mod: TC,LT

## 2020-03-16 PROCEDURE — 36415 COLL VENOUS BLD VENIPUNCTURE: CPT | Performed by: PHYSICIAN ASSISTANT

## 2020-03-16 PROCEDURE — 99213 OFFICE O/P EST LOW 20 MIN: CPT | Mod: Z6 | Performed by: PHYSICIAN ASSISTANT

## 2020-03-16 PROCEDURE — 73721 MRI JNT OF LWR EXTRE W/O DYE: CPT | Mod: TC,LT

## 2020-03-16 PROCEDURE — G0463 HOSPITAL OUTPT CLINIC VISIT: HCPCS

## 2020-03-16 PROCEDURE — G0463 HOSPITAL OUTPT CLINIC VISIT: HCPCS | Mod: 25

## 2020-03-16 PROCEDURE — 80048 BASIC METABOLIC PNL TOTAL CA: CPT | Performed by: PHYSICIAN ASSISTANT

## 2020-03-16 NOTE — ED TRIAGE NOTES
Presents for left knee pain since a slip and fall this morning.  States its sore and is unable to bare weight on it as it gives out on him now.  No over the counter medications.

## 2020-03-16 NOTE — ED PROVIDER NOTES
"  History     Chief Complaint   Patient presents with     Knee Pain     slipped on the ice this morning landing forward on his knees. c/o left knee pain. pt able to move his left leg but not able to bear weight on left leg. \"It wants to give out on me.\"      HPI  Dino Guzman is a 69 year old male who presents with complaints of severe left knee pain and instability after a fall on ice at home this morning.  Patient states he is unable to bear any weight on the knee and it feels very unstable with minimal movement.  He has not taken anything for pain.    Allergies:  No Known Allergies    Problem List:    There are no active problems to display for this patient.       Past Medical History:    No past medical history on file.    Social History:  Marital Status:   [2]  Social History     Tobacco Use     Smoking status: Never Smoker     Smokeless tobacco: Never Used   Substance Use Topics     Alcohol use: Not on file     Drug use: Not on file        Medications:    Acetaminophen (TYLENOL PO)          Review of Systems :  Constitutional: Negative for fever.   MS: Positive for knee pain  Neuro: Neg for loss of sensation distal to knee      Physical Exam   BP: (!) 170/104  Heart Rate: 88  Temp: 97  F (36.1  C)  Resp: 18  SpO2: 98 %    Physical Exam   Constitutional: Well-developed and well-nourished.   Head: Normocephalic and atraumatic.   Eyes: Conjunctivae and EOM are normal. Pupils are equal, round, and reactive to light.   Neck: Normal range of motion.   Pulmonary/Chest: Effort normal  Skin: Skin is warm and dry. No rash noted.   Neuro: Gait normal.    Left knee - tender to palpation of proximal knee.  Positive lever test on the left.  CMS intact distal to injury.  Palpable DP pulse.      ED Course               Results for orders placed or performed during the hospital encounter of 03/16/20 (from the past 24 hour(s))   XR Knee Left 3 Views    Narrative    XR KNEE LT 3 VW    HISTORY: 69 years Male fall on " ice, unable to bear weight    COMPARISON: None    TECHNIQUE: 4 views left knee    FINDINGS: Joint spaces are congruent. Articular surfaces are smooth.  There is medial compartment joint space narrowing and patellofemoral  compartment joint space narrowing with slight lateral patellar  subluxation. There is degenerative enthesopathy of the of the patella  at the patellar tendon insertion.    There is calcification in the soft tissues above the patella, seen on  the lateral view. Question possibility of quadriceps tendon avulsion  injury      Impression    IMPRESSION: Enthesopathy of the superior patella at the patellar  tendon insertion, question possibility of patellar tendon avulsion  injury.    RONN ESTRADA MD       Medications - No data to display    Assessments & Plan (with Medical Decision Making)     I have reviewed the nursing notes.    I have reviewed the findings, diagnosis, plan and need for follow up with the patient.  May take up to 800 mg ibuprofen every 8 hours as needed for pain taken with food or milk.    Patient placed in crutches and knee immobilizer.  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         Medication List      There are no discharge medications for this visit.         Final diagnoses:   Acute pain of left knee       MOHSEN Armenta on 3/16/2020 at 10:03 AM   3/16/2020   [facility]       Paul Rowland PA  03/16/20 1010

## 2020-03-17 ENCOUNTER — TRANSFERRED RECORDS (OUTPATIENT)
Dept: HEALTH INFORMATION MANAGEMENT | Facility: CLINIC | Age: 70
End: 2020-03-17

## 2020-04-03 ENCOUNTER — TRANSFERRED RECORDS (OUTPATIENT)
Dept: HEALTH INFORMATION MANAGEMENT | Facility: CLINIC | Age: 70
End: 2020-04-03

## 2020-04-23 ENCOUNTER — TRANSFERRED RECORDS (OUTPATIENT)
Dept: HEALTH INFORMATION MANAGEMENT | Facility: CLINIC | Age: 70
End: 2020-04-23

## 2020-06-10 ENCOUNTER — TRANSFERRED RECORDS (OUTPATIENT)
Dept: HEALTH INFORMATION MANAGEMENT | Facility: CLINIC | Age: 70
End: 2020-06-10

## 2021-01-21 ENCOUNTER — VIRTUAL VISIT (OUTPATIENT)
Dept: RADIATION ONCOLOGY | Facility: HOSPITAL | Age: 71
End: 2021-01-21
Attending: RADIOLOGY
Payer: COMMERCIAL

## 2021-01-21 VITALS — BODY MASS INDEX: 39.24 KG/M2 | WEIGHT: 250 LBS | HEIGHT: 67 IN

## 2021-01-21 DIAGNOSIS — C44.329 SQUAMOUS CELL CARCINOMA OF FOREHEAD: Chronic | ICD-10-CM

## 2021-01-21 PROCEDURE — 99205 OFFICE O/P NEW HI 60 MIN: CPT | Mod: 95 | Performed by: RADIOLOGY

## 2021-01-21 ASSESSMENT — MIFFLIN-ST. JEOR: SCORE: 1862.62

## 2021-01-21 ASSESSMENT — ENCOUNTER SYMPTOMS
VOICE CHANGE: 0
FEVER: 0
ADENOPATHY: 0
PSYCHIATRIC NEGATIVE: 1
RESPIRATORY NEGATIVE: 1
CHILLS: 0
EYE PROBLEMS: 0
MUSCULOSKELETAL NEGATIVE: 1
BRUISES/BLEEDS EASILY: 0
HEMATOLOGIC/LYMPHATIC NEGATIVE: 1
CONSTITUTIONAL NEGATIVE: 1
NEUROLOGICAL NEGATIVE: 1
ENDOCRINE NEGATIVE: 1
HEMOPTYSIS: 0
TROUBLE SWALLOWING: 0
COUGH: 0
GASTROINTESTINAL NEGATIVE: 1
EYES NEGATIVE: 1
SHORTNESS OF BREATH: 0
CARDIOVASCULAR NEGATIVE: 1

## 2021-01-21 ASSESSMENT — PAIN SCALES - GENERAL: PAINLEVEL: NO PAIN (0)

## 2021-01-21 NOTE — PROGRESS NOTES
"  [facility]  DEPARTMENT OF RADIATION ONCOLOGY  CONSULTATION NOTE (Virtual Visit)    Name: Dino Guzman MRN: [id number]   : 1950 (70 year old)  Date of Service: 2021 Referring: Dr. 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 Following expert resection of the lesion, surgical oncology referred the patient for consultation about the role of adjuvant radiation therapy to improve local control (Primary: 2.7 cm moderately differentiated invasive squamous cell carcinoma with extension through the skeletal muscle and into the underlying adipose tissue. The deep and peripheral margins were negative (deep margin presumably was minimal). Perineural invasion present: lymphatic invasion absent. Nodes: cN0). Among the patient's pertinent circumstances are steady but relatively slow healing of the flap reconstruction (good cosmesis). Multiple sutures sutures remain over 1 month after surgery.    History of Present Illness   The patient's oncologic history across multiple institutions is briefly abstracted as follows. In July, the patient noticed a \"zit bump\" on his forehead. After scratching the lesion to remove it, the wound remained open and eventually scabbed. The wound never completely healed, leaving the patient to apply hydrogen peroxide several times a week. Each time, the scab would eventually fall away, and increasingly large scab would eventually form. The patient ultimately sought surgical evaluation. The abdominal right eyelid elevation with retention of full closure). Examination was negative for regional dinesh disease. On 2020, " surgical oncology performed a wide local excision of the lesion. The operative note described a lesion that was significant larger than anticipated by clinical evaluation. The gross 3.3 cm disease was fixed to the skull. The size of the resection required advancement flap for adequate closure (The submitted right forehead specimen measured 7 x 3.2 cm with a depth ranging from 0.5-0.7 cm. Within the center of the specimen was a 2.7 x 2.4 x 0.6 cm moderately differentiated invasive squamous cell carcinoma. The lesion grossly appeared pink-red to brown, necrotic, raised, and fungating but with well-defined borders. The deep and peripheral margins were negative. Additional features noted above. A surgical wound has healed steadily but slowly. Sutures have gradually been removed over 2 visits, the most recent approximately 1 week ago. He has 5 remaining sutures and 4 Steri-Strips. He is scheduled to see surgical oncology on 2/7/2021 for additional wound management. Note that unrelated cranial imaging in 2020 does not appear to show gross invasion of the frontal bone based on my inspection of the images.    Patient feels in relatively good health. He reports easy closure of the right thigh following the flap reconstruction. He denies infection, purulent discharge, or pain at the site of surgery. He states that wound care has been good and as directed by the surgical team. He denies new lesions in the forehead or masses in the parotid, periparotid, submandibular, or cervical regions. He denies headache, nausea, or vomiting. He denies orbital or cutaneous pain. He denies trismus. He denies history of radiation exposure, chemotherapy, or collagen vascular diseases. We counseled the patient about COVID-19 risks, precautions, and potential impact on his radiation therapy. He denied known exposure to COVID-19, risky behaviors, or related symptoms including febrile, chest, gustatory, gastrointestinal, and systemic  complaints.    Chemotherapy History: No.  Radiation History: No.  Implanted Cardiac Devices: No.  Implanted Chest Wall Infusion Port: No.    Past Medical History:   Diagnosis Date     Cancer (H)     squamous cell cancer of right forehead     Past Surgical History:   Procedure Laterality Date     OTHER SURGICAL HISTORY Right     exicsion of forehead     OTHER SURGICAL HISTORY      repair of left quadracep (slipped on the ice)     Current Outpatient Medications   Medication Instructions     Acetaminophen (TYLENOL PO) 1,500 mg, Oral     Allergies: Patient has no known allergies.    Social History   Patient is , lives with his wife, and has 2 grown children. He is a retired  and  at the [employer]  Tobacco Use     Smoking status: Former Smoker     Packs/day: 20.00     Smokeless tobacco: Never Used   Substance Use Topics     Alcohol use: Yes     Comment: rare     Drug use: None     Family History   Problem Relation Age of Onset     Cancer No family hx of    No cancers in first or second degree relatives.    Review of Systems (supplemental to the ROS documented in the EMR and the HPI)   Review of Systems   Constitutional: Negative.  Negative for chills and fever.   HENT:   Positive for hearing loss and tinnitus. Negative for lump/mass, trouble swallowing and voice change.    Eyes: Negative.  Negative for eye problems.   Respiratory: Negative.  Negative for cough, hemoptysis and shortness of breath.    Cardiovascular: Negative.    Gastrointestinal: Negative.    Endocrine: Negative.    Genitourinary: Negative.     Musculoskeletal: Negative.    Skin: Negative.         Incision to right forehead still has about 5 stitches and 4 steri-strips in place.    Neurological: Negative.    Hematological: Negative.  Negative for adenopathy. Does not bruise/bleed easily.   Psychiatric/Behavioral: Negative.    Pain: No Pain (0).    Physical Exam (patient assisted)   KPS: 90.  ECOG Status: 0 (Fully active, able to carry on  "all activities without restriction)  Vitals: Ht 1.702 m (5' 7\")   Wt 113.4 kg (250 lb)   BMI 39.16 kg/m      General: Appears clinically stable.. Appears relatively well. Moderately obese (class II). Appears rested. Appears staged age. Appears in  well-developed, well-nourished, and in no acute distress. Does not appear acutely or chronically ill. Appears well groomed.  Head: Excellent cosmesis. Right forehead has a horizontal incision that crosses the midline. Has several remaining sutures and 4 Steri-Strips. Wound appears clean, dry, and intact. No purulent discharge. Minimal erythema. No gross satellite lesions. No alopecia. Normocephalic.    Eyes: Minimal elevation of the upper right eyelid. Easily closes lid tightly. Anicteric, vision grossly intact.  ENT: Good volume. No hoarseness.  Neck: Symmetric, trachea midline.  Lymphatics: No gross parotid, peripartoid, cervical, or supraclavicular adenopathy.  Chest/Breasts:  No port. No implanted cardiac device.   Lungs: Easy respirations. No use of accessory musculature.  Cardiovascular: No jugulovenous distension.  MSK: No arm edema or hand edema. Good muscle tone. Good posture.  Integument: No jaundice or pallor. No cellulitis.  Neurologic: Alert, oriented, fluent. Memory grossly intact. Motor grossly intact. Sensory grossly intact. No ataxia.  Psychologic: Well spoken and well read about his cancer and therapies. Clear, consistent thoughts and opinions. Led much of the discussion. High level of critical thinking and intelligence. Good comprehension and processing of new information. Did not require repeating of questions or answers. Progressively complex discussions and questions.  Well constructed answers. Complex speech and though patterns. Appropriately anxious about radiation therapy and sad about diagnosis. Pleasant, cooperative, insightful, concrete, and good judgment.    Time, MDM Data Reviewed and Analyzed   MDM is based on review of the specific notes, " reports, and images including those referenced above in the HPI. Also based on discussions with the medical team.    Tests, documents, or independent historian(s) analyses include but are not limited to:  o Reviewed the surgeon consult note, follow-up note, and operative note. Reviewed the medicine note. Reviewed the pathology report.    Independent Interpretations of tests include but are not limited to:  o Reviewed orbit plain films from 2020. Images do not indicate gross invasion of the frontal bone on the right side.    Discussion of management or test interpretation include but are not limited to:  o Coordinated with the surgical service for coordination care with surgery and multidisciplinary care due to delayed wound healing and appropriate dates for simulation and the start of radiation therapy as well as removal of additional sutures.    Information Review   Video telemedicine visit from my office to the patient's home via [employer] at the patient's preference (9:45-10:09 AM). I reviewed the case with the patient, evaluated him, and discussed his treatment options and risks of therapy. I reviewed the medical records, radiographic information, and pathologic studies. I reviewed the imaging studies via PACS. I reviewed the nursing and patient intake sheets. I offered to speak with his family members and friends today by speakerphone. The patient declined my offer but granted me permission to speak with them if they contact me or come to clinic. The patient is a good historian, began the consultation with good insights into the disease process, and acknowledged the potentially poor consequences of his disease. He has proudly educated himself in depth through a variety of educational media including the Internet. He demonstrated good comprehension of our discussion and explored the treatment options with good understanding. The patient asked pertinent questions and insightful follow-up questions. I illustrated  the basic anatomy and field of treatment. We discussed the onsite and telemedicine coverage of our clinic. He declined referral for an additional opinion or conservative care. He accepted referral to our social work staff for additional resources including potential counseling. The patient granted me permission to exchange medical information and records with other physicians. No therapeutic radiation protocols for the patient's disease are available at our Center.    Assessment    In summary, I concur with the recommendation of postoperative radiation therapy following gross and microscopic total resection of a high-risk moderately differentiated squamous cell carcinoma of the right forehead. The deep margin at the bone is the site of principal risk of recurrence due to the deep invasion pathologically observed and the gross fixation of the mass to the frontal bone observed intraoperatively. Remarkably unfortunately for the patient, the deep margin was microscopically negative but nevertheless requires radiation therapy because of the high risk of local recurrence. Nevertheless even with radiation therapy, the risk of local recurrence will remain significant. Low radiation therapy can increase the rate of local control. A survival benefit is not anticipated. In the absence of clinically suspicious dinesh disease, radiographic staging is not indicated. The patient's bone is healing relatively slowly, and multiple sutures remain in place. We will therefore coordinate with surgical oncology at the start of radiation therapy. Tentatively, I plan to simulate the patient in early February. The patient has an appointment to see surgical oncology on 2/7/2021 for additional wound management. Once cleared, radiation therapy will proceed. The patient wished proceed as recommended.    During today s visit, the patient and I discussed his diagnosis of a high-risk squamous cell carcinoma of the forehead skin, We discussed his  fair prognosis even with aggressive multimodality local treatment. We reviewed the concept of multimodality care and its evolution in the treatment of non-melanomatous cancer. We discussed the fact that systemic therapy is not indicated for his clinical and pathologic features. We discussed the limitations of imaging studies such as MRI, CT, and radiopharmaceuticals (e.g., PET-CT) for identifying local, regional, and distant disease. We discussed his desire to proceed with potentially curative treatment instead of conservative care. We discussed the potentially beneficial role of radiation therapy in the context of evolving standards of care and national guidelines such as the NCCN, ACR, and MONA and those that address management during the COVID-19 pandemic. We reviewed the indications for radiation therapy to the gross disease, omission of elective treatment of regional nodes, and omission of concurrent or sequential chemotherapy (chemoradiation). I do not feel that close surveillance is recommended for this patient s clinical parameters.    We discussed the non-radioactive nature of radiation therapy delivered from a Sk[employer] TrueBeam linac. We discussed differences between electron beam and photon beam treatment, particularly guarding dose to the brain that underlies the forehead skin and skull. We discussed the concept of CT-based simulation with indexed body immobilization to improve the reproducibility of the daily setup and the stability during the actual treatment. After advanced computerized treatment planning, we would deliver his treatment with a customized electron beam cutout. Together, these techniques permit good coverage of complex target tissues while maintaining adequate sparing of adjacent normal critical structures such as the underlying brain, surrounding normal skin, and nearby optic structures and nerves of the face and orbit. I would not use protons, TomoTherapy, brachytherapy, or  radioprotectant agents. I do not feel that these methods offer a clear benefit, particularly with the doses used for the proposed treatment and the absence of complicating anatomical geometry or comorbid conditions. The patient appears to have no absolute contraindications to radiation therapy.    We discussed in detail the relative risks, benefits, rationale, potential side effects, alternatives, and adjuncts to radiation therapy for a forehead skin cancer. The side effects may be acute or chronic, and may be progressive, painful, and negatively impact the patient s long-term quality of life. They may require medical or surgical intervention. The patient has a relatively high risk of symptomatic acute toxicity. The toxicities include but are not limited to wound breakdown, infection, pain, soft tissue necrosis, bone necrosis, encephalopathy, cognitive dysfunction, alopecia, peripheral nerve dysfunction, forehead and eyelid weakness, sinusitis, and toxicities to other nearby organ systems as well as systemic toxicities such as fatigue and long-term risks such as second malignancy. His slow wound healing over months is among the factors that can significantly increase the risk and impact of toxicity. We answered all questions to satisfaction. Thank you for allowing us to participate in the care of this very pleasant patient.    Plan   1) Simulation: Anticipate simulating the patient in early 2/2021.  2) Wound care: The patient has an appointment to see his surgeon on 2/7/2021 for additional suture removal and wound care.   3) Radiation therapy: Will start radiation therapy when cleared by surgical oncology. Given the anticipated stability of the overall geometry of the wound, radiation therapy can start weeks after simulation if required. Radiation therapy will use electron beam instead of photons to limit dose to the underlying brain.  4) COVID-19: Discussed the potential impact of the pandemic on his treatment and  our ability to deliver therapy. Discussed current strategies and approaches currently being used in our Department to treat positive, negative, and suspected patients regarding COVID-19.      Jay Ross M.D., Ph.D.  Department of Radiation Oncology  Tel: [phone]  Fax: [phone]    cc:  Christy Wesley M.D. (breast surgery)  Kuldip Lazo D.O. (medicine)

## 2021-01-21 NOTE — PROGRESS NOTES
"[facility]  DEPARTMENT OF RADIATION ONCOLOGY  INITIAL PATIENT ASSESSMENT    Name: Dino Guzman MRN: [id number]   : 1950 (70 year old)  Date of Service: 2021   Referring: Christy Wesley       Other Physicians: Kuldip Lazo    Diagnosis: squamous cell cancer of right forehead    Chief Complaint   Patient presents with     Radiation Therapy       Initial Ht 1.702 m (5' 7\")   Wt 113.4 kg (250 lb)   BMI 39.16 kg/m   Estimated body mass index is 39.16 kg/m  as calculated from the following:    Height as of this encounter: 1.702 m (5' 7\").    Weight as of this encounter: 113.4 kg (250 lb).  Medication Reconciliation: complete    Prior radiation therapy: None    Prior chemotherapy: None    Prior hormonal therapy:N/A    Pain Eval:  Denies    Psychosocial  Marital Status:    Spouse/Significant other: Luz Elena   Children: 2   Occupation:  at [employer]    Retired: No  Living arrangements: lives with spouse  Do you feel safe at home? Yes  Activity status: independent   referral needs: Not needed    Advanced Directive: No      Review of Systems   Constitutional: Negative.    HENT:   Positive for hearing loss and tinnitus.    Eyes: Negative.    Respiratory: Negative.    Cardiovascular: Negative.    Gastrointestinal: Negative.    Endocrine: Negative.    Genitourinary: Negative.     Musculoskeletal: Negative.    Skin: Negative.         Incision to right forehead still has about 5 stitches and 4 steri-strips in place.    Neurological: Negative.    Hematological: Negative.    Psychiatric/Behavioral: Negative.        Patient was assessed for the influenza, pneumo-poly, prevnar 13, Tdap, and shingles immunizations.   Patient was assessed using the NCCN psychosocial distress thermometer. Patient rated the score as a 0. Patient rated current stressors as NA. Stressors will be brought to the attention of provider or Oncology RN Care Coordinator for a score of 6 or greater " or per nurses discretion.     Virtual consult performed today d/t COVID-19 precautions.  Referred to radiation therapy for cancer of right forehead.  Educated patient on the mapping process and the possible side effects of XRT to the forehead, to include: hair loss, skin irritation, and fatigue.  Pt verbalizes an understanding and has no questions at this time.    Maribel Cai RN

## 2021-01-22 NOTE — PROGRESS NOTES
"[facility]  Nutrition Assessment    Dino Guzman    1950 Jan 21, 2021    Diagnosis: Squamous cell cancer of right forehead    Height: 5' 7\" Weight: 250 lbs 0 oz    Usual Weight: 250# Weight Change: 0      Past Medical History:   Diagnosis Date     Cancer (H)     squamous cell cancer of right forehead       1. Receiving Chemotherapy? No - 0 points  2. Weight Loss per Month (in pounds) Based on Usual Weight: 0    100# 100-120# 120-150# 150-200# >200# Pt. System   > 5 5 - 6 6 - 8 7 - 10 > 10 1 Point   > 7 7- 9 9 - 11 11 - 14 > 15 2 Points   > 10 10 - 12 12 - 15 15 - 20 > 20 3 Points       3. Eating Problems: ( 1 Point for Each Problem)       Loss of Appetite     No - 0 points    Difficulty Swallowing    No - 0 points   Nausea    No - 0 points    Early Satiety    No - 0 points   Vomiting    No - 0 points    Taste/Smell Aversions  No - 0 points    Diarrhea    No - 0 points    Esophageal Reflux   No - 0 points   Mouth Soreness/Difficulty Chewing  No - 0 points          Total: 0    4.  Automatic Referral for the Following Diagnosis or Situations: na     Comments:     Total Score: 0 (Scores >/= 4 will receive a Dietician Consult)        Maribel Cai RN    "

## 2021-02-02 ENCOUNTER — ALLIED HEALTH/NURSE VISIT (OUTPATIENT)
Dept: RADIATION ONCOLOGY | Facility: HOSPITAL | Age: 71
End: 2021-02-02
Attending: RADIOLOGY
Payer: COMMERCIAL

## 2021-02-02 DIAGNOSIS — C44.329 SQUAMOUS CELL CARCINOMA OF FOREHEAD: Primary | Chronic | ICD-10-CM

## 2021-02-02 PROCEDURE — 77334 RADIATION TREATMENT AID(S): CPT | Performed by: RADIOLOGY

## 2021-02-02 PROCEDURE — 77290 THER RAD SIMULAJ FIELD CPLX: CPT | Mod: 26 | Performed by: RADIOLOGY

## 2021-02-02 PROCEDURE — 77334 RADIATION TREATMENT AID(S): CPT | Mod: 26 | Performed by: RADIOLOGY

## 2021-02-02 PROCEDURE — 77290 THER RAD SIMULAJ FIELD CPLX: CPT | Performed by: RADIOLOGY

## 2021-02-02 PROCEDURE — 77263 THER RADIOLOGY TX PLNG CPLX: CPT | Performed by: RADIOLOGY

## 2021-02-02 PROCEDURE — 2894A VOIDCORRECT: CPT | Performed by: RADIOLOGY

## 2021-02-02 NOTE — PROGRESS NOTES
"  [facility]  DEPARTMENT OF RADIATION ONCOLOGY  FOLLOW-UP NOTE    Name: Dino Guzman MRN: [id number]   : 1950 (70 year old)  Date of Service: 2021 Referring: Dr. 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 Following expert resection of the lesion, surgical oncology referred the patient for consultation about the role of adjuvant radiation therapy to improve local control (Primary: Within the center of a 7-cm specimen, a 2.7 cm moderately differentiated invasive squamous cell carcinoma with extension through the skeletal muscle and into the underlying adipose tissue. The deep and peripheral margins were negative (deep margin presumably was minimal). Perineural invasion present; lymphatic invasion absent. Nodes: cN0). Among the patient's pertinent circumstances are steady but relatively slow healing of the flap reconstruction (good cosmesis) from his surgery on 2020.     Recent History   The patient comes to clinic for evaluation in preparation for simulation and radiation therapy for high-risk squamous cell carcinoma of the right forehead. He feels well and offers no new complaints. He denies new masses or suspicious skin changes of the forehead, temples, parotid, periparotid, or cervical regions. He reports that he has been unable to express \"pus\" from the central portion of the wound for the last 3 days. He continues topical wound care, no longer wears a bandage, and has no remaining Steri-Strips. He reports minimal forehead discomfort. He reports good closure of the right eye even while sleeping. He last saw his " surgeon on 1/12/2021 and will see the service on 2/5/2021 for additional wound care and removal of additional sutures.    We discussed the consensus recommendation for postoperative radiation therapy to improve local control (the treatment does not offer a strong survival benefit). We reviewed the indications for postoperative radiation therapy including the deep invasion and minimal deep margin. Elective dinesh irradiation and concurrent chemotherapy are not indicated. We reviewed in detail the relative risks, benefits, rationale, potential side effects, alternatives, and adjuncts to external beam radiation therapy. We discussed the nature of electron beam radiation therapy and its potential benefits over photon radiation therapy, particularly with regard to increase dose to the skin and reduce dose to the underlying brain. Due to the impaired wound healing, the patient has a significantly elevated risk of wound breakdown, soft tissue necrosis, and infection as well as a small risk of neurologic deficits due to the proximity of the brain underlying the treatment treatment. The patient wished to proceed with simulation and preparations for radiation therapy. With nursing, we therefore obtained written consent.    Chemotherapy History: No.  Radiation History: No.  Implanted Cardiac Devices: No.  Implanted Chest Wall Infusion Port: No.    Past Medical History:   Diagnosis Date     Cancer (H)     squamous cell cancer of right forehead     Past Surgical History:   Procedure Laterality Date     OTHER SURGICAL HISTORY Right     exicsion of forehead     OTHER SURGICAL HISTORY      repair of left quadracep (slipped on the ice)     Current Outpatient Medications   Medication Instructions     Acetaminophen (TYLENOL PO) 1,500 mg, Oral     Allergies: Patient has no known allergies.    Social History     Tobacco Use     Smoking status: Former Smoker     Packs/day: 20.00     Smokeless tobacco: Never Used   Substance Use Topics      Alcohol use: Yes     Comment: rare     Drug use: Not on file     Family History   Problem Relation Age of Onset     Cancer No family hx of      Physical Exam   KPS: 90.  Vitals: There were no vitals taken for this visit.    Clinical Examination:  General: Appears clinically stable. Appears well. Moderately obese (class II). Appears rested. Appears stated age. Appears in good general health, well-developed, well-nourished, and in no acute distress. Does not appear acutely or chronically ill. Appears well groomed.  Head: Excellent postoperative cosmesis. Right forehead has a horizontal incision that extends just beyond the mild. The incision appears fully closed. No crepitus, warmth, discharge, odor, or gross cellulitis. Minimal surrounding edema appears to represent appropriate healing. Three small vertically oriented scabs are present along the lateral half of the scar. No suspicious masses or skin changes of the forehead, scalp, temple, parotid, or periparotid regions. Natural alopecia pattern with hairline a few centimeters above the forehead.   Eyes: Good eyelid closure. Anicteric, eyelids symmetric, vision grossly intact.  ENT: Good volume. No hoarseness.  Neck: Soft, supple, symmetric, trachea midline.  Lymphatics: No parotid, peripartoid, cervical, or supraclavicular adenopathy.  Chest/Breasts:  No port. No implanted cardiac device.   Lungs: Easy respirations. No use of accessory musculature.  Cardiovascular: No jugulovenous distension.  MSK: Shoulder range of motion is good. No arm edema or hand edema. Good muscle tone. Good posture. No tenderness on palpation.  Integument: No jaundice or pallor. No cellulitis.  Neurologic: Right-handed. Alert, oriented, fluent. Memory grossly intact. Motor grossly intact. Sensory grossly intact. No ataxia.  Psychologic: Well spoken and well read about his cancer and therapies. Upbeat, relaxed, dynamic, motivated, engaging. Pleasant, cooperative, insightful, concrete, and  good judgment.    Time on Day of Encounter, and MDM Data Reviewed and Analyzed on Day of Encounter   Time spent on patient activities is based on Chart review 14 minutes, Visit 24 minutes, and Documentation 35 minutes. Total time: 73 minutes .  MDM based on       Tests, documents, or independent historian(s) analyses include but are not limited to:  o 12/4/2020 surgical operative note.  o 12/4/2020 pathology report.       Independent Interpretations of tests include but are not limited to:  o CT-based simulation ordered for today.  o 3/16/2020 orbit films are not suspicious for direct invasion of the forehead by overlying malignancy.       Discussion with the medical team about management or test interpretation include but are not limited to:  o For coordination of wound care and initiation of radiation therapy, notified surgical service of the plan to delay radiation therapy until surgical oncology clears patient to begin treatment.    Recent Labs   Lab Test 03/16/20  1012      POTASSIUM 4.4   CHLORIDE 107   CO2 28   *   BUN 21   CR 1.00   JOSEFINA 9.7     Assessment/Plan   The patient has multiple indications for postoperative radiation therapy and no relative or absolute contraindications to the treatment. I recommended proceeding with CT-based simulation today. The patient will require additional days if not weeks of additional healing before surgical oncology clears the patient to begin radiation therapy (wound healing does not need to be complete before radiation therapy and typically continues through radiation therapy). This time interval will permit our radiation treatment planning team to develop a sophisticated customized plan of radiation therapy that will optimize coverage of the operative bed (with margin), particularly the deep portions, while minimizing dose to the underlying brain and nearby optical structures. The geometry and physical shape of the target region is not anticipated to change  in the coming weeks (therefore, a simulation based on today's anatomy is expected to remain valid if treatment does not start until later in the month).  1) Simulation: Proceed with today's CT-based simulation with a customized thermoplastic facemask and possible customized electron bolus.  2) Surgical oncology: Proceed with the pending scheduled follow-up for removal of additional sutures and guidance about the potential start date of radiation therapy.  3) Radiation therapy: Anticipate using electrons beam therapy with an customized electron cutout. Fractionation will likely be moderately hypofractionated due to the histology. Regional dinesh irradiation will be omitted. Eye shields will not be used since the direct path of the beam will not be through optic structures (shields potentially would increase rather than decrease dose if they block/reflect the exit of scatter radiation therapy).  4) COVID-19: Discussed the potential impact of the pandemic on his treatment and our ability to deliver therapy. Discussed current strategies and approaches currently being used in our Department to treat positive, negative, and suspected patients regarding COVID-19.      Jay Ross M.D., Ph.D.  Department of Radiation Oncology  Tel: [phone]  Fax: [phone]    cc:  Christy Wesley M.D. (breast surgery)  Kuldip Lazo D.O. (medicine)

## 2021-02-03 NOTE — PROGRESS NOTES
[facility]  DEPARTMENT OF RADIATION ONCOLOGY  SIMULATION NOTE    Name: Dino Guzman MRN: [id number]   : 1950 (70 year old)  Date of Service: 2021 Referring: Dr. 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 Following expert resection of the lesion, surgical oncology referred the patient for consultation about the role of adjuvant radiation therapy to improve local control (Primary: Within the center of a 7-cm specimen, a 2.7 cm moderately differentiated invasive squamous cell carcinoma with extension through the skeletal muscle and into the underlying adipose tissue. The deep and peripheral margins were negative (deep margin presumably was minimal). Perineural invasion present; lymphatic invasion absent. Nodes: cN0). Among the patient's pertinent circumstances are steady but relatively slow healing of the flap reconstruction (good cosmesis) from his surgery on 2020.     Procedure: The patient comes to clinic for simulation and radiation therapy for squamous cell carcinoma of the right forehead (curative intent). With the patient, we verified his identification, consent, site, and side of treatment. We evaluated multiple positions for setup and treatment, and elected to simulate the patient in a modified supine position. We used orthogonal lasers to align him with the CT simulator. We immobilized the patient with a customized thermoplastic facemask to improve the reproducibility and safety for daily radiation therapy. We placed radiopaque markers (e.g., wire over the scar) to assist in identifying topographical landmarks for  simulation. We obtained  and axial CT imaging through the head. Therapy, treatment planning, and I used virtual simulation techniques to verify the adequacy of the CT images and to create a preliminary isocenter for setup of treatment. We placed marks on the mask to delineate the that isocenter. He tolerated the procedure well and without complications.    We will use the available radiation therapy imaging studies for CT-based treatment planning. I anticipate utilizing electron beam radiation therapy with a customized cutout and possible bolus to develop a computerized treatment plan whose dosimetric analysis (e.g., dose-volume histogram (DVH)) indicates adequate coverage of target tissues and sparing of nearby normal structures (e.g., brain, eyes, lacrimal glands). We will complete routine QA procedures. I do not anticipate the use of a boost/cone down plan. We will coordinate are with surgical oncology regarding clearance to start radiation therapy due to delayed wound healing. The patient wished to proceed as recommended. We answered all questions to his satisfaction.      Jay Ross M.D., Ph.D.  Department of Radiation Oncology  Tel: [phone]  Fax: [phone]

## 2021-02-09 ENCOUNTER — TELEPHONE (OUTPATIENT)
Dept: RADIATION ONCOLOGY | Facility: HOSPITAL | Age: 71
End: 2021-02-09

## 2021-02-10 PROCEDURE — 77300 RADIATION THERAPY DOSE PLAN: CPT | Mod: 26 | Performed by: RADIOLOGY

## 2021-02-10 PROCEDURE — 77334 RADIATION TREATMENT AID(S): CPT | Performed by: RADIOLOGY

## 2021-02-10 PROCEDURE — 77300 RADIATION THERAPY DOSE PLAN: CPT | Performed by: RADIOLOGY

## 2021-02-10 PROCEDURE — 77334 RADIATION TREATMENT AID(S): CPT | Mod: 26 | Performed by: RADIOLOGY

## 2021-02-15 ENCOUNTER — APPOINTMENT (OUTPATIENT)
Dept: RADIATION ONCOLOGY | Facility: HOSPITAL | Age: 71
End: 2021-02-15
Attending: RADIOLOGY
Payer: COMMERCIAL

## 2021-02-15 ENCOUNTER — RESULTS ONLY (OUTPATIENT)
Dept: RADIATION ONCOLOGY | Facility: HOSPITAL | Age: 71
End: 2021-02-15

## 2021-02-15 LAB
RAD ONC ARIA COURSE ID: NORMAL
RAD ONC ARIA COURSE LAST TREATMENT DATE: NORMAL
RAD ONC ARIA COURSE START DATE: NORMAL
RAD ONC ARIA COURSE TREATMENT ELAPSED DAYS: 0
RAD ONC ARIA FIRST TREATMENT DATE: NORMAL
RAD ONC ARIA PLAN FRACTIONS TREATED TO DATE: 1
RAD ONC ARIA PLAN ID: NORMAL
RAD ONC ARIA PLAN NAME: NORMAL
RAD ONC ARIA PLAN PRESCRIBED DOSE PER FRACTION: 2.5 GY
RAD ONC ARIA PLAN TOTAL FRACTIONS PRESCRIBED: 20
RAD ONC ARIA PLAN TOTAL PRESCRIBED DOSE: 5000 CGY
RAD ONC ARIA REFERENCE POINT DOSAGE GIVEN TO DATE: NORMAL GY
RAD ONC ARIA REFERENCE POINT DOSAGE GIVEN TO DATE: NORMAL GY
RAD ONC ARIA REFERENCE POINT ID: NORMAL
RAD ONC ARIA REFERENCE POINT ID: NORMAL

## 2021-02-15 PROCEDURE — 77280 THER RAD SIMULAJ FIELD SMPL: CPT | Mod: 26 | Performed by: RADIOLOGY

## 2021-02-15 PROCEDURE — 77280 THER RAD SIMULAJ FIELD SMPL: CPT | Performed by: RADIOLOGY

## 2021-02-15 PROCEDURE — 77412 RADIATION TX DELIVERY LVL 3: CPT | Performed by: RADIOLOGY

## 2021-02-16 ENCOUNTER — APPOINTMENT (OUTPATIENT)
Dept: RADIATION ONCOLOGY | Facility: HOSPITAL | Age: 71
End: 2021-02-16
Payer: COMMERCIAL

## 2021-02-16 ENCOUNTER — RESULTS ONLY (OUTPATIENT)
Dept: RADIATION ONCOLOGY | Facility: HOSPITAL | Age: 71
End: 2021-02-16

## 2021-02-16 LAB
RAD ONC ARIA COURSE ID: NORMAL
RAD ONC ARIA COURSE LAST TREATMENT DATE: NORMAL
RAD ONC ARIA COURSE START DATE: NORMAL
RAD ONC ARIA COURSE TREATMENT ELAPSED DAYS: 1
RAD ONC ARIA FIRST TREATMENT DATE: NORMAL
RAD ONC ARIA PLAN FRACTIONS TREATED TO DATE: 2
RAD ONC ARIA PLAN ID: NORMAL
RAD ONC ARIA PLAN NAME: NORMAL
RAD ONC ARIA PLAN PRESCRIBED DOSE PER FRACTION: 2.5 GY
RAD ONC ARIA PLAN TOTAL FRACTIONS PRESCRIBED: 20
RAD ONC ARIA PLAN TOTAL PRESCRIBED DOSE: 5000 CGY
RAD ONC ARIA REFERENCE POINT DOSAGE GIVEN TO DATE: NORMAL GY
RAD ONC ARIA REFERENCE POINT DOSAGE GIVEN TO DATE: NORMAL GY
RAD ONC ARIA REFERENCE POINT ID: NORMAL
RAD ONC ARIA REFERENCE POINT ID: NORMAL

## 2021-02-16 PROCEDURE — 77412 RADIATION TX DELIVERY LVL 3: CPT | Performed by: RADIOLOGY

## 2021-02-17 ENCOUNTER — RESULTS ONLY (OUTPATIENT)
Dept: RADIATION ONCOLOGY | Facility: HOSPITAL | Age: 71
End: 2021-02-17

## 2021-02-17 ENCOUNTER — OFFICE VISIT (OUTPATIENT)
Dept: RADIATION ONCOLOGY | Facility: HOSPITAL | Age: 71
End: 2021-02-17
Payer: COMMERCIAL

## 2021-02-17 VITALS — WEIGHT: 263 LBS | BODY MASS INDEX: 41.19 KG/M2

## 2021-02-17 DIAGNOSIS — C44.329 SQUAMOUS CELL CARCINOMA OF FOREHEAD: Primary | Chronic | ICD-10-CM

## 2021-02-17 LAB
RAD ONC ARIA COURSE ID: NORMAL
RAD ONC ARIA COURSE LAST TREATMENT DATE: NORMAL
RAD ONC ARIA COURSE START DATE: NORMAL
RAD ONC ARIA COURSE TREATMENT ELAPSED DAYS: 2
RAD ONC ARIA FIRST TREATMENT DATE: NORMAL
RAD ONC ARIA PLAN FRACTIONS TREATED TO DATE: 3
RAD ONC ARIA PLAN ID: NORMAL
RAD ONC ARIA PLAN NAME: NORMAL
RAD ONC ARIA PLAN PRESCRIBED DOSE PER FRACTION: 2.5 GY
RAD ONC ARIA PLAN TOTAL FRACTIONS PRESCRIBED: 20
RAD ONC ARIA PLAN TOTAL PRESCRIBED DOSE: 5000 CGY
RAD ONC ARIA REFERENCE POINT DOSAGE GIVEN TO DATE: NORMAL GY
RAD ONC ARIA REFERENCE POINT DOSAGE GIVEN TO DATE: NORMAL GY
RAD ONC ARIA REFERENCE POINT ID: NORMAL
RAD ONC ARIA REFERENCE POINT ID: NORMAL

## 2021-02-17 PROCEDURE — 77412 RADIATION TX DELIVERY LVL 3: CPT | Performed by: RADIOLOGY

## 2021-02-17 ASSESSMENT — PAIN SCALES - GENERAL: PAINLEVEL: NO PAIN (0)

## 2021-02-18 ENCOUNTER — APPOINTMENT (OUTPATIENT)
Dept: RADIATION ONCOLOGY | Facility: HOSPITAL | Age: 71
End: 2021-02-18
Payer: COMMERCIAL

## 2021-02-18 ENCOUNTER — RESULTS ONLY (OUTPATIENT)
Dept: RADIATION ONCOLOGY | Facility: HOSPITAL | Age: 71
End: 2021-02-18

## 2021-02-18 LAB
RAD ONC ARIA COURSE ID: NORMAL
RAD ONC ARIA COURSE LAST TREATMENT DATE: NORMAL
RAD ONC ARIA COURSE START DATE: NORMAL
RAD ONC ARIA COURSE TREATMENT ELAPSED DAYS: 3
RAD ONC ARIA FIRST TREATMENT DATE: NORMAL
RAD ONC ARIA PLAN FRACTIONS TREATED TO DATE: 4
RAD ONC ARIA PLAN ID: NORMAL
RAD ONC ARIA PLAN NAME: NORMAL
RAD ONC ARIA PLAN PRESCRIBED DOSE PER FRACTION: 2.5 GY
RAD ONC ARIA PLAN TOTAL FRACTIONS PRESCRIBED: 20
RAD ONC ARIA PLAN TOTAL PRESCRIBED DOSE: 5000 CGY
RAD ONC ARIA REFERENCE POINT DOSAGE GIVEN TO DATE: NORMAL GY
RAD ONC ARIA REFERENCE POINT DOSAGE GIVEN TO DATE: NORMAL GY
RAD ONC ARIA REFERENCE POINT ID: NORMAL
RAD ONC ARIA REFERENCE POINT ID: NORMAL

## 2021-02-18 PROCEDURE — 77412 RADIATION TX DELIVERY LVL 3: CPT | Performed by: RADIOLOGY

## 2021-02-18 NOTE — PROGRESS NOTES
"     [facility]  DEPARTMENT OF RADIATION ONCOLOGY   ON TREATMENT VISIT (OTV) NOTE    Name: Dino Guzman MRN: [id number]   : 1950 (70 year old)  Date of Service: 2021 Referring: Dr. 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 Following expert resection of the lesion, surgical oncology referred the patient for adjuvant radiation therapy to improve local control (Primary: Within the center of a 7-cm specimen, a 2.7 cm moderately differentiated invasive squamous cell carcinoma with extension through the skeletal muscle and into the underlying adipose tissue. The deep and peripheral margins were negative (deep margin presumably was minimal). Perineural invasion present; lymphatic invasion absent. Nodes: cN0). Among the patient's pertinent circumstances are steady but relatively slow healing of the flap reconstruction (good cosmesis) from his surgery on 2020.     Subjective   Feels well. Offers no new complaints. Finds setup and treatment to be easy. Feels well. Feels that he has no toxicity from radiation therapy. Acknowledges a 2-3 mm scab that correlates to the central point of the resected disease and the area the wound that healed slowest. Reports that the scar \"comes and goes\". Surgery aware. Reports no suspicious masses or skin changes of the right forehead, broader scalp, parotid, periparotid, and neck regions. Denies, headache, nausea, vomiting, confusion, mental status changes, sinusitis, eye dryness, or visual changes. Daily COVID-19 screening negative for barrier to proceeding with radiation therapy.    ROS " (score of 0 indicates that symptom is at baseline for most sections below)   See Flowsheet for additional comments as indicated.  No Pain (0)  Nutrition Alteration  Diet Type: Patient's Preference  Anorexia NCI: 0 - None  Skin  Skin Reaction: 0 - No changes  Skin Intervention: miaderm/aquaphor given     ENT and Mouth Exam  Mucositis - Current: 0 - None   Mucositis - Internal Severity: 0 - None      Gastrointestinal  Nausea: 0 - None  Vomitin - No vomiting (ons)     Psychosocial  Mood - Anxiety: 0 - Normal  Mood - Depression: 0 - Normal       Objective   Vitals: Wt 119.3 kg (263 lb)   BMI 41.19 kg/m    Wt Readings from Last 3 Encounters:   21 119.3 kg (263 lb)   21 113.4 kg (250 lb)   18 122.5 kg (270 lb)     Evaluation (also seen at [facility] during the week):  KPS: 90.  General: Appears clinically stable.. Appears in good general health. Moderately obese (class II, BMI 35-39). Appears rested. Appears staged age. Appears in well-developed, well-nourished, and in no acute distress. Does not appear acutely or chronically ill. Appears well groomed.  Head: Horizontal right forehead scar has 2-3 mm dry, dark scab nears its central point. Visible correlates to the center of the resected mass. No bleeding. Wound otherwise fully closed and healed. No suspicious masses or skin changes. Eyelid closure remains good. No discharge, bleeding, cellulitis, warmth, crepitus, or tenderness. Natural alopecia pattern. Normocephalic.  Eyes: Anicteric, vision grossly intact.  ENT: Good volume. No hoarseness.  Neck: Symmetric, trachea midline.  Lymphatics: No parotid, peripartoid, cervical, or supraclavicular adenopathy.  Lungs: Easy respirations. No use of accessory musculature.  MSK: Shoulder range of motion is good. No arm edema or hand edema. Good muscle tone. Good posture.   Integument: No jaundice or pallor.   Neurologic: Alert, oriented, fluent. Memory grossly intact. Motor grossly intact. Sensory grossly intact.  No ataxia.  Psychologic: Upbeat, relaxed, confident, engaging, and motivated. Pleasant, cooperative, insightful, and concrete.    Impression   Routine tolerance to radiation therapy. Grade 0 toxicity attributable to radiation therapy.    Plan   1) Skin: Grade 0. Scab will continue be to monitored because or risk of local recurrence of melanoma. Given the recent wound healing, the scab is more likely due to ongoing healing. Nevertheless, low threshold to biopsy even during radiation therapy. Counseled again that such healing often continues through radiation therapy rather than deteriorates. Reviewed routine skin care. Anticipate rapid eventual escalation of skin reaction (erythema, hyperpigmentation, desquamation, and pain) with a peak about 1-2 weeks after the end of treatment.   2) Pain: Grade 0. Anticipate possible use of non-opoid analgesics for pain relief as local toxicities progress.  3) Radiation therapy: No new interventions. Reviewed graphical 3D plan with attention to dose to the brain and eyes. Reviewed anticipated time course, nature, and potential interventions for managing toxicity during and after radiation therapy. Patient aware of onsite and telemedicine coverage of our clinic. He wished to proceed with treatment. All questions answered to his satisfaction.     Radiation Oncology Weekly Review   Reviewed available labs and diagnostic studies. Interpreted as adequate to proceed with radiation therapy. Discussed management with Therapy, Treatment Planning, and Nursing. Reviewed weekly routine QA, linac imaging, and clinical set up are adequate to proceed with radiation therapy as prescribed.    Additional Data, Medications, Allergies     Current Outpatient Medications   Medication     Acetaminophen (TYLENOL PO)     No current facility-administered medications for this visit.      Allergies: Patient has no known allergies.      Jay Ross M.D., Ph.D.  Department of Radiation Oncology  Tel: [phone]  Fax: [phone]

## 2021-02-19 ENCOUNTER — RESULTS ONLY (OUTPATIENT)
Dept: RADIATION ONCOLOGY | Facility: HOSPITAL | Age: 71
End: 2021-02-19

## 2021-02-19 ENCOUNTER — APPOINTMENT (OUTPATIENT)
Dept: RADIATION ONCOLOGY | Facility: HOSPITAL | Age: 71
End: 2021-02-19
Payer: COMMERCIAL

## 2021-02-19 LAB
RAD ONC ARIA COURSE ID: NORMAL
RAD ONC ARIA COURSE LAST TREATMENT DATE: NORMAL
RAD ONC ARIA COURSE START DATE: NORMAL
RAD ONC ARIA COURSE TREATMENT ELAPSED DAYS: 4
RAD ONC ARIA FIRST TREATMENT DATE: NORMAL
RAD ONC ARIA PLAN FRACTIONS TREATED TO DATE: 5
RAD ONC ARIA PLAN ID: NORMAL
RAD ONC ARIA PLAN NAME: NORMAL
RAD ONC ARIA PLAN PRESCRIBED DOSE PER FRACTION: 2.5 GY
RAD ONC ARIA PLAN TOTAL FRACTIONS PRESCRIBED: 20
RAD ONC ARIA PLAN TOTAL PRESCRIBED DOSE: 5000 CGY
RAD ONC ARIA REFERENCE POINT DOSAGE GIVEN TO DATE: NORMAL GY
RAD ONC ARIA REFERENCE POINT DOSAGE GIVEN TO DATE: NORMAL GY
RAD ONC ARIA REFERENCE POINT ID: NORMAL
RAD ONC ARIA REFERENCE POINT ID: NORMAL

## 2021-02-19 PROCEDURE — 77336 RADIATION PHYSICS CONSULT: CPT | Performed by: RADIOLOGY

## 2021-02-19 PROCEDURE — 77412 RADIATION TX DELIVERY LVL 3: CPT | Performed by: RADIOLOGY

## 2021-02-19 PROCEDURE — 77427 RADIATION TX MANAGEMENT X5: CPT | Performed by: RADIOLOGY

## 2021-02-22 ENCOUNTER — APPOINTMENT (OUTPATIENT)
Dept: RADIATION ONCOLOGY | Facility: HOSPITAL | Age: 71
End: 2021-02-22
Payer: COMMERCIAL

## 2021-02-22 ENCOUNTER — RESULTS ONLY (OUTPATIENT)
Dept: RADIATION ONCOLOGY | Facility: HOSPITAL | Age: 71
End: 2021-02-22

## 2021-02-22 LAB
RAD ONC ARIA COURSE ID: NORMAL
RAD ONC ARIA COURSE LAST TREATMENT DATE: NORMAL
RAD ONC ARIA COURSE START DATE: NORMAL
RAD ONC ARIA COURSE TREATMENT ELAPSED DAYS: 7
RAD ONC ARIA FIRST TREATMENT DATE: NORMAL
RAD ONC ARIA PLAN FRACTIONS TREATED TO DATE: 6
RAD ONC ARIA PLAN ID: NORMAL
RAD ONC ARIA PLAN NAME: NORMAL
RAD ONC ARIA PLAN PRESCRIBED DOSE PER FRACTION: 2.5 GY
RAD ONC ARIA PLAN TOTAL FRACTIONS PRESCRIBED: 20
RAD ONC ARIA PLAN TOTAL PRESCRIBED DOSE: 5000 CGY
RAD ONC ARIA REFERENCE POINT DOSAGE GIVEN TO DATE: NORMAL GY
RAD ONC ARIA REFERENCE POINT DOSAGE GIVEN TO DATE: NORMAL GY
RAD ONC ARIA REFERENCE POINT ID: NORMAL
RAD ONC ARIA REFERENCE POINT ID: NORMAL

## 2021-02-22 PROCEDURE — 77412 RADIATION TX DELIVERY LVL 3: CPT | Performed by: RADIOLOGY

## 2021-02-23 ENCOUNTER — RESULTS ONLY (OUTPATIENT)
Dept: RADIATION ONCOLOGY | Facility: HOSPITAL | Age: 71
End: 2021-02-23

## 2021-02-23 ENCOUNTER — APPOINTMENT (OUTPATIENT)
Dept: RADIATION ONCOLOGY | Facility: HOSPITAL | Age: 71
End: 2021-02-23
Payer: COMMERCIAL

## 2021-02-23 LAB
RAD ONC ARIA COURSE ID: NORMAL
RAD ONC ARIA COURSE LAST TREATMENT DATE: NORMAL
RAD ONC ARIA COURSE START DATE: NORMAL
RAD ONC ARIA COURSE TREATMENT ELAPSED DAYS: 8
RAD ONC ARIA FIRST TREATMENT DATE: NORMAL
RAD ONC ARIA PLAN FRACTIONS TREATED TO DATE: 7
RAD ONC ARIA PLAN ID: NORMAL
RAD ONC ARIA PLAN NAME: NORMAL
RAD ONC ARIA PLAN PRESCRIBED DOSE PER FRACTION: 2.5 GY
RAD ONC ARIA PLAN TOTAL FRACTIONS PRESCRIBED: 20
RAD ONC ARIA PLAN TOTAL PRESCRIBED DOSE: 5000 CGY
RAD ONC ARIA REFERENCE POINT DOSAGE GIVEN TO DATE: NORMAL GY
RAD ONC ARIA REFERENCE POINT DOSAGE GIVEN TO DATE: NORMAL GY
RAD ONC ARIA REFERENCE POINT ID: NORMAL
RAD ONC ARIA REFERENCE POINT ID: NORMAL

## 2021-02-23 PROCEDURE — 77412 RADIATION TX DELIVERY LVL 3: CPT | Performed by: RADIOLOGY

## 2021-02-24 ENCOUNTER — APPOINTMENT (OUTPATIENT)
Dept: RADIATION ONCOLOGY | Facility: HOSPITAL | Age: 71
End: 2021-02-24
Payer: COMMERCIAL

## 2021-02-24 ENCOUNTER — RESULTS ONLY (OUTPATIENT)
Dept: RADIATION ONCOLOGY | Facility: HOSPITAL | Age: 71
End: 2021-02-24

## 2021-02-24 ENCOUNTER — CARE COORDINATION (OUTPATIENT)
Dept: CASE MANAGEMENT | Facility: HOSPITAL | Age: 71
End: 2021-02-24

## 2021-02-24 ENCOUNTER — OFFICE VISIT (OUTPATIENT)
Dept: RADIATION ONCOLOGY | Facility: HOSPITAL | Age: 71
End: 2021-02-24
Payer: COMMERCIAL

## 2021-02-24 VITALS — BODY MASS INDEX: 41.16 KG/M2 | WEIGHT: 262.8 LBS

## 2021-02-24 DIAGNOSIS — C44.329 SQUAMOUS CELL CARCINOMA OF FOREHEAD: Primary | Chronic | ICD-10-CM

## 2021-02-24 LAB
RAD ONC ARIA COURSE ID: NORMAL
RAD ONC ARIA COURSE LAST TREATMENT DATE: NORMAL
RAD ONC ARIA COURSE START DATE: NORMAL
RAD ONC ARIA COURSE TREATMENT ELAPSED DAYS: 9
RAD ONC ARIA FIRST TREATMENT DATE: NORMAL
RAD ONC ARIA PLAN FRACTIONS TREATED TO DATE: 8
RAD ONC ARIA PLAN ID: NORMAL
RAD ONC ARIA PLAN NAME: NORMAL
RAD ONC ARIA PLAN PRESCRIBED DOSE PER FRACTION: 2.5 GY
RAD ONC ARIA PLAN TOTAL FRACTIONS PRESCRIBED: 20
RAD ONC ARIA PLAN TOTAL PRESCRIBED DOSE: 5000 CGY
RAD ONC ARIA REFERENCE POINT DOSAGE GIVEN TO DATE: NORMAL GY
RAD ONC ARIA REFERENCE POINT DOSAGE GIVEN TO DATE: NORMAL GY
RAD ONC ARIA REFERENCE POINT ID: NORMAL
RAD ONC ARIA REFERENCE POINT ID: NORMAL

## 2021-02-24 PROCEDURE — 77412 RADIATION TX DELIVERY LVL 3: CPT | Performed by: RADIOLOGY

## 2021-02-24 ASSESSMENT — PAIN SCALES - GENERAL: PAINLEVEL: NO PAIN (0)

## 2021-02-24 NOTE — PROGRESS NOTES
Assessment completed by Mae Odell with Dino Guzman.    LOC: alert; oriented    Dx: squamous cell carcinoma of forehead    Lives with: wife, Luz Elena. & dog, Taylor.  Living at:  Home in [place], [place]  Transportation: Drives himself    Primary PCP: Kuldip Lazo  Insurance:  BCBS    Support System:  States he has a good support system. 2 children and wife.  Homecare/PCA: N/A  /[facility] Services:   No  [place]: Yes    Health Care Directive: patient has been given information.  Guardian: N/A  POA: N/A    Pharmacy: [facility]  Meds management: Manages own meds.    Adequate Resources for needs (housing, utilities, food/med): Yes  Household chores: Can complete these between him and his wife. No issues.  Work/community/social activity: Is still currently working for [employer] For the last 44 years.    ADLs: Has no issues completing these.  Ambulation:Ambulates with no issues.  Falls: Last year on ice.  Nutrition: Good  Sleep: Good    Equipment used: None     Oxygen supplier: N/A      Does the supplier have valid oxygen orders: N/A    Mental health: States that MH is good.  Substance abuse: Denies  Exposure to violence/abuse: Denies  Stressors: Denies  Hobbies: fishing, going to [employer], and going on the pontoon.    Plan: Patient will continue radiation treatment at [facility].    Met with patient following radiation treatment. Patient states that he has a good support system between family, friends, and his dog. Patient has had no issues with completing any ADLs or chores. Patient denied any MH concerns, stressors, substance abuse, or exposure to violence/abuse. Patient was given information on healthcare directives and had no other questions or concerns for me at this time. Patient was given corinna and Gill Gallego's contact information if any questions do arise.    Mae Odell,  Student

## 2021-02-24 NOTE — PROGRESS NOTES
"     [facility]  DEPARTMENT OF RADIATION ONCOLOGY   ON TREATMENT VISIT (OTV) NOTE    Name: Dino Guzman MRN: [id number]   : 1950 (70 year old)  Date of Service: 2021 Referring: Dr. 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 Following expert resection of the lesion, surgical oncology referred the patient for adjuvant radiation therapy to improve local control (Primary: Within the center of a 7-cm specimen, a 2.7 cm moderately differentiated invasive squamous cell carcinoma with extension through the skeletal muscle and into the underlying adipose tissue. The deep and peripheral margins were negative (deep margin presumably was minimal). Perineural invasion present; lymphatic invasion absent. Nodes: cN0). Among the patient's pertinent circumstances are steady but relatively slow healing of the flap reconstruction (good cosmesis) from his surgery on 2020.     Subjective   Feels well. Offers no new complaints. Feels that he has minimal skin toxicity from radiation therapy. Scab over central point of scar has not recurred. Patient pleased with wound healing. Stable subtle but easily tolerable eyelid elevation/limitation of closure. Reports no suspicious masses or skin changes of the right forehead, broader scalp, parotid, periparotid, and neck regions. Denies, headache, nausea, vomiting, confusion, mental status changes, sinusitis, eye dryness, or visual changes. Daily COVID-19 screening negative for barrier to proceeding with radiation therapy.    ROS (score of 0 indicates that symptom is at baseline for most " sections below)   See Flowsheet for additional comments as indicated.  No Pain (0)  Nutrition Alteration  Diet Type: Patient's Preference  Anorexia NCI: 0 - None  Skin  Skin Reaction: 0 - No changes  Skin Intervention: miaderm/aquaphor given  CNS Alteration  CNS note: denies h/a; denies dizzines        Gastrointestinal  Nausea: 0 - None  Vomitin - No vomiting (ons)     Psychosocial  Mood - Anxiety: 0 - Normal  Mood - Depression: 0 - Normal  Pyschosocial Note: fatigue: 0       Objective   Vitals: Wt 119.2 kg (262 lb 12.8 oz)   BMI 41.16 kg/m    Wt Readings from Last 3 Encounters:   21 119.2 kg (262 lb 12.8 oz)   21 119.3 kg (263 lb)   21 113.4 kg (250 lb)     Evaluation (also seen at linac during the week):  KPS: 90.  General: Appears clinically stable.. Appears in good general health. Moderately obese (class II, BMI 35-39). Appears rested. Appears staged age. Appears in well-developed, well-nourished, and in no acute distress. Does not appear acutely or chronically ill. Appears well groomed.  Head: Slight erythema over the treatment region (below the hairline). Horizontal right forehead scar. No scab. Region appears fully healed. No bleeding. No suspicious masses or skin changes. Eyelid closure remains good. No discharge, bleeding, cellulitis, warmth, crepitus, or tenderness. Natural alopecia pattern. Normocephalic.  Eyes: Anicteric, vision grossly intact.  ENT: Good volume. No hoarseness.  Neck: Symmetric, trachea midline.  Lymphatics: No parotid, peripartoid, cervical, or supraclavicular adenopathy.  Lungs: Easy respirations. No use of accessory musculature.  MSK: Shoulder range of motion is good. No arm edema or hand edema. Good muscle tone. Good posture.   Integument: No jaundice or pallor.   Neurologic: Alert, oriented, fluent. Memory grossly intact. Motor grossly intact. Sensory grossly intact. No ataxia.  Psychologic: Upbeat, relaxed, confident, engaging, and motivated. Pleasant,  cooperative, insightful, and concrete.    Impression   Routine tolerance to radiation therapy. Grade 1 toxicity attributable to radiation therapy.    Plan   1) Skin: Grade 1. As expected, wound continues to heal. Once again fully closed. Minor erythema is early tolerated. Reviewed routine skin care. Anticipate rapid eventual escalation of skin reaction (erythema, hyperpigmentation, desquamation, and pain) with a peak about 1-2 weeks after the end of treatment.   2) Pain: Grade 0. Anticipate possible use of non-opoid analgesics for pain relief as local toxicities progress.  3) Radiation therapy: No new interventions. Reviewed graphical 3D plan with attention to dose to the brain and eyes. Reviewed anticipated time course, nature, and potential interventions for managing toxicity during and after radiation therapy. Patient aware of onsite and telemedicine coverage of our clinic. He wished to proceed with treatment. All questions answered to his satisfaction.     Radiation Oncology Weekly Review   Reviewed available labs and diagnostic studies. Interpreted as adequate to proceed with radiation therapy. Discussed management with Therapy, Treatment Planning, and Nursing. Reviewed weekly routine QA, linac imaging, and clinical set up are adequate to proceed with radiation therapy as prescribed.    Additional Data, Medications, Allergies     Current Outpatient Medications   Medication     Acetaminophen (TYLENOL PO)     No current facility-administered medications for this visit.      Allergies: Patient has no known allergies.      Jay Ross M.D., Ph.D.  Department of Radiation Oncology  Tel: [phone]  Fax: [phone]

## 2021-02-25 ENCOUNTER — APPOINTMENT (OUTPATIENT)
Dept: RADIATION ONCOLOGY | Facility: HOSPITAL | Age: 71
End: 2021-02-25
Payer: COMMERCIAL

## 2021-02-25 ENCOUNTER — RESULTS ONLY (OUTPATIENT)
Dept: RADIATION ONCOLOGY | Facility: HOSPITAL | Age: 71
End: 2021-02-25

## 2021-02-25 LAB
RAD ONC ARIA COURSE ID: NORMAL
RAD ONC ARIA COURSE LAST TREATMENT DATE: NORMAL
RAD ONC ARIA COURSE START DATE: NORMAL
RAD ONC ARIA COURSE TREATMENT ELAPSED DAYS: 10
RAD ONC ARIA FIRST TREATMENT DATE: NORMAL
RAD ONC ARIA PLAN FRACTIONS TREATED TO DATE: 9
RAD ONC ARIA PLAN ID: NORMAL
RAD ONC ARIA PLAN NAME: NORMAL
RAD ONC ARIA PLAN PRESCRIBED DOSE PER FRACTION: 2.5 GY
RAD ONC ARIA PLAN TOTAL FRACTIONS PRESCRIBED: 20
RAD ONC ARIA PLAN TOTAL PRESCRIBED DOSE: 5000 CGY
RAD ONC ARIA REFERENCE POINT DOSAGE GIVEN TO DATE: NORMAL GY
RAD ONC ARIA REFERENCE POINT DOSAGE GIVEN TO DATE: NORMAL GY
RAD ONC ARIA REFERENCE POINT ID: NORMAL
RAD ONC ARIA REFERENCE POINT ID: NORMAL

## 2021-02-25 PROCEDURE — 77412 RADIATION TX DELIVERY LVL 3: CPT | Performed by: RADIOLOGY

## 2021-02-26 ENCOUNTER — APPOINTMENT (OUTPATIENT)
Dept: RADIATION ONCOLOGY | Facility: HOSPITAL | Age: 71
End: 2021-02-26
Payer: COMMERCIAL

## 2021-02-26 ENCOUNTER — RESULTS ONLY (OUTPATIENT)
Dept: RADIATION ONCOLOGY | Facility: HOSPITAL | Age: 71
End: 2021-02-26

## 2021-02-26 LAB
RAD ONC ARIA COURSE ID: NORMAL
RAD ONC ARIA COURSE LAST TREATMENT DATE: NORMAL
RAD ONC ARIA COURSE START DATE: NORMAL
RAD ONC ARIA COURSE TREATMENT ELAPSED DAYS: 11
RAD ONC ARIA FIRST TREATMENT DATE: NORMAL
RAD ONC ARIA PLAN FRACTIONS TREATED TO DATE: 10
RAD ONC ARIA PLAN ID: NORMAL
RAD ONC ARIA PLAN NAME: NORMAL
RAD ONC ARIA PLAN PRESCRIBED DOSE PER FRACTION: 2.5 GY
RAD ONC ARIA PLAN TOTAL FRACTIONS PRESCRIBED: 20
RAD ONC ARIA PLAN TOTAL PRESCRIBED DOSE: 5000 CGY
RAD ONC ARIA REFERENCE POINT DOSAGE GIVEN TO DATE: NORMAL GY
RAD ONC ARIA REFERENCE POINT DOSAGE GIVEN TO DATE: NORMAL GY
RAD ONC ARIA REFERENCE POINT ID: NORMAL
RAD ONC ARIA REFERENCE POINT ID: NORMAL

## 2021-02-26 PROCEDURE — 77336 RADIATION PHYSICS CONSULT: CPT | Performed by: RADIOLOGY

## 2021-02-26 PROCEDURE — 77427 RADIATION TX MANAGEMENT X5: CPT | Performed by: RADIOLOGY

## 2021-02-26 PROCEDURE — 77412 RADIATION TX DELIVERY LVL 3: CPT | Performed by: RADIOLOGY

## 2021-03-01 ENCOUNTER — RESULTS ONLY (OUTPATIENT)
Dept: RADIATION ONCOLOGY | Facility: HOSPITAL | Age: 71
End: 2021-03-01

## 2021-03-01 ENCOUNTER — APPOINTMENT (OUTPATIENT)
Dept: RADIATION ONCOLOGY | Facility: HOSPITAL | Age: 71
End: 2021-03-01
Attending: RADIOLOGY
Payer: COMMERCIAL

## 2021-03-01 LAB
RAD ONC ARIA COURSE ID: NORMAL
RAD ONC ARIA COURSE LAST TREATMENT DATE: NORMAL
RAD ONC ARIA COURSE START DATE: NORMAL
RAD ONC ARIA COURSE TREATMENT ELAPSED DAYS: 14
RAD ONC ARIA FIRST TREATMENT DATE: NORMAL
RAD ONC ARIA PLAN FRACTIONS TREATED TO DATE: 11
RAD ONC ARIA PLAN ID: NORMAL
RAD ONC ARIA PLAN NAME: NORMAL
RAD ONC ARIA PLAN PRESCRIBED DOSE PER FRACTION: 2.5 GY
RAD ONC ARIA PLAN TOTAL FRACTIONS PRESCRIBED: 20
RAD ONC ARIA PLAN TOTAL PRESCRIBED DOSE: 5000 CGY
RAD ONC ARIA REFERENCE POINT DOSAGE GIVEN TO DATE: NORMAL GY
RAD ONC ARIA REFERENCE POINT DOSAGE GIVEN TO DATE: NORMAL GY
RAD ONC ARIA REFERENCE POINT ID: NORMAL
RAD ONC ARIA REFERENCE POINT ID: NORMAL

## 2021-03-01 PROCEDURE — 77412 RADIATION TX DELIVERY LVL 3: CPT | Performed by: RADIOLOGY

## 2021-03-02 ENCOUNTER — APPOINTMENT (OUTPATIENT)
Dept: RADIATION ONCOLOGY | Facility: HOSPITAL | Age: 71
End: 2021-03-02
Payer: COMMERCIAL

## 2021-03-02 ENCOUNTER — RESULTS ONLY (OUTPATIENT)
Dept: RADIATION ONCOLOGY | Facility: HOSPITAL | Age: 71
End: 2021-03-02

## 2021-03-02 LAB
RAD ONC ARIA COURSE ID: NORMAL
RAD ONC ARIA COURSE LAST TREATMENT DATE: NORMAL
RAD ONC ARIA COURSE START DATE: NORMAL
RAD ONC ARIA COURSE TREATMENT ELAPSED DAYS: 15
RAD ONC ARIA FIRST TREATMENT DATE: NORMAL
RAD ONC ARIA PLAN FRACTIONS TREATED TO DATE: 12
RAD ONC ARIA PLAN ID: NORMAL
RAD ONC ARIA PLAN NAME: NORMAL
RAD ONC ARIA PLAN PRESCRIBED DOSE PER FRACTION: 2.5 GY
RAD ONC ARIA PLAN TOTAL FRACTIONS PRESCRIBED: 20
RAD ONC ARIA PLAN TOTAL PRESCRIBED DOSE: 5000 CGY
RAD ONC ARIA REFERENCE POINT DOSAGE GIVEN TO DATE: NORMAL GY
RAD ONC ARIA REFERENCE POINT DOSAGE GIVEN TO DATE: NORMAL GY
RAD ONC ARIA REFERENCE POINT ID: NORMAL
RAD ONC ARIA REFERENCE POINT ID: NORMAL

## 2021-03-02 PROCEDURE — 77412 RADIATION TX DELIVERY LVL 3: CPT | Performed by: RADIOLOGY

## 2021-03-03 ENCOUNTER — OFFICE VISIT (OUTPATIENT)
Dept: RADIATION ONCOLOGY | Facility: HOSPITAL | Age: 71
End: 2021-03-03
Payer: COMMERCIAL

## 2021-03-03 ENCOUNTER — APPOINTMENT (OUTPATIENT)
Dept: RADIATION ONCOLOGY | Facility: HOSPITAL | Age: 71
End: 2021-03-03
Payer: COMMERCIAL

## 2021-03-03 ENCOUNTER — RESULTS ONLY (OUTPATIENT)
Dept: RADIATION ONCOLOGY | Facility: HOSPITAL | Age: 71
End: 2021-03-03

## 2021-03-03 VITALS — WEIGHT: 264 LBS | BODY MASS INDEX: 41.35 KG/M2

## 2021-03-03 DIAGNOSIS — C44.329 SQUAMOUS CELL CARCINOMA OF FOREHEAD: Primary | ICD-10-CM

## 2021-03-03 LAB
RAD ONC ARIA COURSE ID: NORMAL
RAD ONC ARIA COURSE LAST TREATMENT DATE: NORMAL
RAD ONC ARIA COURSE START DATE: NORMAL
RAD ONC ARIA COURSE TREATMENT ELAPSED DAYS: 16
RAD ONC ARIA FIRST TREATMENT DATE: NORMAL
RAD ONC ARIA PLAN FRACTIONS TREATED TO DATE: 13
RAD ONC ARIA PLAN ID: NORMAL
RAD ONC ARIA PLAN NAME: NORMAL
RAD ONC ARIA PLAN PRESCRIBED DOSE PER FRACTION: 2.5 GY
RAD ONC ARIA PLAN TOTAL FRACTIONS PRESCRIBED: 20
RAD ONC ARIA PLAN TOTAL PRESCRIBED DOSE: 5000 CGY
RAD ONC ARIA REFERENCE POINT DOSAGE GIVEN TO DATE: NORMAL GY
RAD ONC ARIA REFERENCE POINT DOSAGE GIVEN TO DATE: NORMAL GY
RAD ONC ARIA REFERENCE POINT ID: NORMAL
RAD ONC ARIA REFERENCE POINT ID: NORMAL

## 2021-03-03 PROCEDURE — 77412 RADIATION TX DELIVERY LVL 3: CPT | Performed by: RADIOLOGY

## 2021-03-03 ASSESSMENT — PAIN SCALES - GENERAL: PAINLEVEL: NO PAIN (0)

## 2021-03-04 ENCOUNTER — APPOINTMENT (OUTPATIENT)
Dept: RADIATION ONCOLOGY | Facility: HOSPITAL | Age: 71
End: 2021-03-04
Payer: COMMERCIAL

## 2021-03-04 ENCOUNTER — RESULTS ONLY (OUTPATIENT)
Dept: RADIATION ONCOLOGY | Facility: HOSPITAL | Age: 71
End: 2021-03-04

## 2021-03-04 LAB
RAD ONC ARIA COURSE ID: NORMAL
RAD ONC ARIA COURSE LAST TREATMENT DATE: NORMAL
RAD ONC ARIA COURSE START DATE: NORMAL
RAD ONC ARIA COURSE TREATMENT ELAPSED DAYS: 17
RAD ONC ARIA FIRST TREATMENT DATE: NORMAL
RAD ONC ARIA PLAN FRACTIONS TREATED TO DATE: 14
RAD ONC ARIA PLAN ID: NORMAL
RAD ONC ARIA PLAN NAME: NORMAL
RAD ONC ARIA PLAN PRESCRIBED DOSE PER FRACTION: 2.5 GY
RAD ONC ARIA PLAN TOTAL FRACTIONS PRESCRIBED: 20
RAD ONC ARIA PLAN TOTAL PRESCRIBED DOSE: 5000 CGY
RAD ONC ARIA REFERENCE POINT DOSAGE GIVEN TO DATE: NORMAL GY
RAD ONC ARIA REFERENCE POINT DOSAGE GIVEN TO DATE: NORMAL GY
RAD ONC ARIA REFERENCE POINT ID: NORMAL
RAD ONC ARIA REFERENCE POINT ID: NORMAL

## 2021-03-04 PROCEDURE — 77412 RADIATION TX DELIVERY LVL 3: CPT | Performed by: RADIOLOGY

## 2021-03-05 ENCOUNTER — RESULTS ONLY (OUTPATIENT)
Dept: RADIATION ONCOLOGY | Facility: HOSPITAL | Age: 71
End: 2021-03-05

## 2021-03-05 ENCOUNTER — APPOINTMENT (OUTPATIENT)
Dept: RADIATION ONCOLOGY | Facility: HOSPITAL | Age: 71
End: 2021-03-05
Payer: COMMERCIAL

## 2021-03-05 LAB
RAD ONC ARIA COURSE ID: NORMAL
RAD ONC ARIA COURSE LAST TREATMENT DATE: NORMAL
RAD ONC ARIA COURSE START DATE: NORMAL
RAD ONC ARIA COURSE TREATMENT ELAPSED DAYS: 18
RAD ONC ARIA FIRST TREATMENT DATE: NORMAL
RAD ONC ARIA PLAN FRACTIONS TREATED TO DATE: 15
RAD ONC ARIA PLAN ID: NORMAL
RAD ONC ARIA PLAN NAME: NORMAL
RAD ONC ARIA PLAN PRESCRIBED DOSE PER FRACTION: 2.5 GY
RAD ONC ARIA PLAN TOTAL FRACTIONS PRESCRIBED: 20
RAD ONC ARIA PLAN TOTAL PRESCRIBED DOSE: 5000 CGY
RAD ONC ARIA REFERENCE POINT DOSAGE GIVEN TO DATE: NORMAL GY
RAD ONC ARIA REFERENCE POINT DOSAGE GIVEN TO DATE: NORMAL GY
RAD ONC ARIA REFERENCE POINT ID: NORMAL
RAD ONC ARIA REFERENCE POINT ID: NORMAL

## 2021-03-05 PROCEDURE — 77412 RADIATION TX DELIVERY LVL 3: CPT | Performed by: RADIOLOGY

## 2021-03-05 PROCEDURE — 77427 RADIATION TX MANAGEMENT X5: CPT | Performed by: RADIOLOGY

## 2021-03-05 PROCEDURE — 77336 RADIATION PHYSICS CONSULT: CPT | Performed by: RADIOLOGY

## 2021-03-08 ENCOUNTER — APPOINTMENT (OUTPATIENT)
Dept: RADIATION ONCOLOGY | Facility: HOSPITAL | Age: 71
End: 2021-03-08
Payer: COMMERCIAL

## 2021-03-08 ENCOUNTER — RESULTS ONLY (OUTPATIENT)
Dept: RADIATION ONCOLOGY | Facility: HOSPITAL | Age: 71
End: 2021-03-08

## 2021-03-08 LAB
RAD ONC ARIA COURSE ID: NORMAL
RAD ONC ARIA COURSE LAST TREATMENT DATE: NORMAL
RAD ONC ARIA COURSE START DATE: NORMAL
RAD ONC ARIA COURSE TREATMENT ELAPSED DAYS: 21
RAD ONC ARIA FIRST TREATMENT DATE: NORMAL
RAD ONC ARIA PLAN FRACTIONS TREATED TO DATE: 16
RAD ONC ARIA PLAN ID: NORMAL
RAD ONC ARIA PLAN NAME: NORMAL
RAD ONC ARIA PLAN PRESCRIBED DOSE PER FRACTION: 2.5 GY
RAD ONC ARIA PLAN TOTAL FRACTIONS PRESCRIBED: 20
RAD ONC ARIA PLAN TOTAL PRESCRIBED DOSE: 5000 CGY
RAD ONC ARIA REFERENCE POINT DOSAGE GIVEN TO DATE: NORMAL GY
RAD ONC ARIA REFERENCE POINT DOSAGE GIVEN TO DATE: NORMAL GY
RAD ONC ARIA REFERENCE POINT ID: NORMAL
RAD ONC ARIA REFERENCE POINT ID: NORMAL

## 2021-03-08 PROCEDURE — 77412 RADIATION TX DELIVERY LVL 3: CPT | Performed by: RADIOLOGY

## 2021-03-09 ENCOUNTER — RESULTS ONLY (OUTPATIENT)
Dept: RADIATION ONCOLOGY | Facility: HOSPITAL | Age: 71
End: 2021-03-09

## 2021-03-09 ENCOUNTER — TELEPHONE (OUTPATIENT)
Dept: RADIATION ONCOLOGY | Facility: HOSPITAL | Age: 71
End: 2021-03-09
Payer: COMMERCIAL

## 2021-03-09 ENCOUNTER — APPOINTMENT (OUTPATIENT)
Dept: RADIATION ONCOLOGY | Facility: HOSPITAL | Age: 71
End: 2021-03-09
Payer: COMMERCIAL

## 2021-03-09 LAB
RAD ONC ARIA COURSE ID: NORMAL
RAD ONC ARIA COURSE LAST TREATMENT DATE: NORMAL
RAD ONC ARIA COURSE START DATE: NORMAL
RAD ONC ARIA COURSE TREATMENT ELAPSED DAYS: 22
RAD ONC ARIA FIRST TREATMENT DATE: NORMAL
RAD ONC ARIA PLAN FRACTIONS TREATED TO DATE: 17
RAD ONC ARIA PLAN ID: NORMAL
RAD ONC ARIA PLAN NAME: NORMAL
RAD ONC ARIA PLAN PRESCRIBED DOSE PER FRACTION: 2.5 GY
RAD ONC ARIA PLAN TOTAL FRACTIONS PRESCRIBED: 20
RAD ONC ARIA PLAN TOTAL PRESCRIBED DOSE: 5000 CGY
RAD ONC ARIA REFERENCE POINT DOSAGE GIVEN TO DATE: NORMAL GY
RAD ONC ARIA REFERENCE POINT DOSAGE GIVEN TO DATE: NORMAL GY
RAD ONC ARIA REFERENCE POINT ID: NORMAL
RAD ONC ARIA REFERENCE POINT ID: NORMAL

## 2021-03-09 PROCEDURE — 77412 RADIATION TX DELIVERY LVL 3: CPT | Performed by: RADIOLOGY

## 2021-03-09 NOTE — TELEPHONE ENCOUNTER
Spoke with patient regarding schedule. Changed appt time from 1115 on 3/10/2021 to 1445 3/10/2021. Patient agreed to this change.   JAMES MORENO

## 2021-03-09 NOTE — PROGRESS NOTES
"     [facility]  DEPARTMENT OF RADIATION ONCOLOGY   ON TREATMENT VISIT (OTV) NOTE    Name: Dino Guzman MRN: [id number]   : 1950 (70 year old)  Date of Service: 2021 Referring: Dr. 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 Following expert resection of the lesion, surgical oncology referred the patient for adjuvant radiation therapy to improve local control (Primary: Within the center of a 7-cm specimen, a 2.7 cm moderately differentiated invasive squamous cell carcinoma with extension through the skeletal muscle and into the underlying adipose tissue. The deep and peripheral margins were negative (deep margin presumably was minimal). Perineural invasion present; lymphatic invasion absent. Nodes: cN0). Among the patient's pertinent circumstances are steady but relatively slow healing of the flap reconstruction (good cosmesis) from his surgery on 2020.     Subjective   Feels well. Offers no new complaints. Acknowledges progression of forehead erythema. Not painful. Scab over central point of scar has not recurred. Patient pleased with wound healing. Stable subtle but easily tolerable eyelid elevation/limitation of closure. Reports no suspicious masses or skin changes of the right forehead, broader scalp, parotid, periparotid, and neck regions. Denies, headache, nausea, vomiting, confusion, mental status changes, sinusitis, eye dryness, or visual changes. Daily COVID-19 screening negative for barrier to proceeding with " radiation therapy.    ROS (score of 0 indicates that symptom is at baseline for most sections below)   See Flowsheet for additional comments as indicated.  No Pain (0)  Nutrition Alteration  Diet Type: Patient's Preference  Anorexia NCI: 0 - None  Skin  Skin Reaction: 1 - Faint erythema or dry desquamation  Skin Intervention: Miaderm  CNS Alteration  CNS note: denies h/a; denies dizzines  ENT and Mouth Exam  Mucositis - Current: 0 - None      Gastrointestinal  Nausea: 0 - None  Vomitin - No vomiting (ons)     Psychosocial  Mood - Anxiety: 0 - Normal  Mood - Depression: 0 - Normal  Pyschosocial Note: fatigue: 0       Objective   Vitals: Wt 119.7 kg (264 lb)   BMI 41.35 kg/m    Wt Readings from Last 3 Encounters:   21 119.7 kg (264 lb)   21 119.2 kg (262 lb 12.8 oz)   21 119.3 kg (263 lb)     Evaluation (also seen at linac during the week):  KPS: 90.  General: Appears clinically stable.. Appears in good general health. Moderately obese (class II, BMI 35-39). Appears rested. Appears staged age. Appears in well-developed, well-nourished, and in no acute distress. Does not appear acutely or chronically ill. Appears well groomed.  Head: Mild erythema over the treatment region (below the hairline). Horizontal right forehead scar. No scab. Region appears fully healed. No bleeding. No suspicious masses or skin changes. Eyelid closure remains good. No discharge, bleeding, cellulitis, warmth, crepitus, or tenderness. Natural alopecia pattern. Normocephalic.  Eyes: Anicteric, vision grossly intact.  ENT: Good volume. No hoarseness.  Neck: Symmetric, trachea midline.  Lymphatics: No parotid, peripartoid, cervical, or supraclavicular adenopathy.  Lungs: Easy respirations. No use of accessory musculature.  MSK: Shoulder range of motion is good. No arm edema or hand edema. Good muscle tone. Good posture.   Integument: No jaundice or pallor.   Neurologic: Alert, oriented, fluent. Memory grossly intact. Motor  grossly intact. Sensory grossly intact. No ataxia.  Psychologic: Upbeat, relaxed, confident, engaging, and motivated. Pleasant, cooperative, insightful, and concrete.    Impression   Routine tolerance to radiation therapy. Grade 1 toxicity attributable to radiation therapy.    Plan   1) Skin: Grade 1. Wound continues to heal. Again fully closed. Minor erythema is early tolerated. Reviewed routine skin care. Anticipate rapid eventual escalation of skin reaction (erythema, hyperpigmentation, desquamation, and pain) with a peak about 1-2 weeks after the end of treatment.   2) Pain: Grade 0. Anticipate possible use of non-opoid analgesics for pain relief as local toxicities progress.  3) Radiation therapy: No new interventions. Reviewed graphical 3D plan with attention to dose to the brain and eyes. Reviewed anticipated time course, nature, and potential interventions for managing toxicity during and after radiation therapy. Patient aware of onsite and telemedicine coverage of our clinic. He wished to proceed with treatment. All questions answered to his satisfaction.     Radiation Oncology Weekly Review   Reviewed available labs and diagnostic studies. Interpreted as adequate to proceed with radiation therapy. Discussed management with Therapy, Treatment Planning, and Nursing. Reviewed weekly routine QA, linac imaging, and clinical set up are adequate to proceed with radiation therapy as prescribed.    Additional Data, Medications, Allergies     Current Outpatient Medications   Medication     Acetaminophen (TYLENOL PO)     No current facility-administered medications for this visit.      Allergies: Patient has no known allergies.      Jay Ross M.D., Ph.D.  Department of Radiation Oncology  Tel: [phone]  Fax: [phone]

## 2021-03-10 ENCOUNTER — RESULTS ONLY (OUTPATIENT)
Dept: RADIATION ONCOLOGY | Facility: HOSPITAL | Age: 71
End: 2021-03-10

## 2021-03-10 ENCOUNTER — OFFICE VISIT (OUTPATIENT)
Dept: RADIATION ONCOLOGY | Facility: HOSPITAL | Age: 71
End: 2021-03-10
Payer: COMMERCIAL

## 2021-03-10 ENCOUNTER — APPOINTMENT (OUTPATIENT)
Dept: RADIATION ONCOLOGY | Facility: HOSPITAL | Age: 71
End: 2021-03-10
Payer: COMMERCIAL

## 2021-03-10 VITALS — WEIGHT: 265.3 LBS | BODY MASS INDEX: 41.55 KG/M2

## 2021-03-10 DIAGNOSIS — C44.329 SQUAMOUS CELL CARCINOMA OF FOREHEAD: Primary | Chronic | ICD-10-CM

## 2021-03-10 LAB
RAD ONC ARIA COURSE ID: NORMAL
RAD ONC ARIA COURSE LAST TREATMENT DATE: NORMAL
RAD ONC ARIA COURSE START DATE: NORMAL
RAD ONC ARIA COURSE TREATMENT ELAPSED DAYS: 23
RAD ONC ARIA FIRST TREATMENT DATE: NORMAL
RAD ONC ARIA PLAN FRACTIONS TREATED TO DATE: 18
RAD ONC ARIA PLAN ID: NORMAL
RAD ONC ARIA PLAN NAME: NORMAL
RAD ONC ARIA PLAN PRESCRIBED DOSE PER FRACTION: 2.5 GY
RAD ONC ARIA PLAN TOTAL FRACTIONS PRESCRIBED: 20
RAD ONC ARIA PLAN TOTAL PRESCRIBED DOSE: 5000 CGY
RAD ONC ARIA REFERENCE POINT DOSAGE GIVEN TO DATE: NORMAL GY
RAD ONC ARIA REFERENCE POINT DOSAGE GIVEN TO DATE: NORMAL GY
RAD ONC ARIA REFERENCE POINT ID: NORMAL
RAD ONC ARIA REFERENCE POINT ID: NORMAL

## 2021-03-10 PROCEDURE — 77412 RADIATION TX DELIVERY LVL 3: CPT | Performed by: RADIOLOGY

## 2021-03-10 ASSESSMENT — PAIN SCALES - GENERAL: PAINLEVEL: NO PAIN (0)

## 2021-03-10 NOTE — PROGRESS NOTES
"     [facility]  DEPARTMENT OF RADIATION ONCOLOGY   ON TREATMENT VISIT (OTV) NOTE    Name: Dino Guzman MRN: [id number]   : 1950 (70 year old)  Date of Service: Mar 10, 2021 Referring: Dr. 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 Following expert resection of the lesion, surgical oncology referred the patient for adjuvant radiation therapy to improve local control (Primary: Within the center of a 7-cm specimen, a 2.7 cm moderately differentiated invasive squamous cell carcinoma with extension through the skeletal muscle and into the underlying adipose tissue. The deep and peripheral margins were negative (deep margin presumably was minimal). Perineural invasion present; lymphatic invasion absent. Nodes: cN0). Among the patient's pertinent circumstances are steady but relatively slow healing of the flap reconstruction (good cosmesis) from his surgery on 2020.     Subjective   Feels well. Offers new complaint of right parotid mass over the ascending ramus of the mandible. Palpated recently during regular check. Not painful. Not overly concerning to the patient but finally felt that he should report the change. Uncertain if it is new. Denies other masses of the head & neck region. Acknowledges progression of forehead erythema. Not painful. Scab over central point of scar has not recurred. Patient pleased with wound healing. Stable subtle but easily tolerable eyelid elevation/limitation of closure. Denies headache, nausea, vomiting, confusion, mental status changes, sinusitis, eye dryness, or visual changes. Daily " COVID-19 screening negative for barrier to proceeding with radiation therapy.    ROS (score of 0 indicates that symptom is at baseline for most sections below)   See Flowsheet for additional comments as indicated.  No Pain (0)  Nutrition Alteration  Diet Type: Patient's Preference  Anorexia NCI: 0 - None  Skin  Skin Reaction: 1 - Faint erythema or dry desquamation  Skin Intervention: Miaderm  CNS Alteration  CNS note: denies h/a; denies dizzines  ENT and Mouth Exam  Mucositis - Current: 0 - None      Gastrointestinal  Nausea: 0 - None  Vomitin - No vomiting (ons)     Psychosocial  Mood - Anxiety: 0 - Normal  Mood - Depression: 0 - Normal  Pyschosocial Note: fatigue: 0       Objective   Vitals: Wt 120.3 kg (265 lb 4.8 oz)   BMI 41.55 kg/m    Wt Readings from Last 3 Encounters:   03/10/21 120.3 kg (265 lb 4.8 oz)   21 119.7 kg (264 lb)   21 119.2 kg (262 lb 12.8 oz)     Evaluation (also seen at linac during the week):  KPS: 90.  General: Appears clinically stable.. Appears in good general health. Moderately obese (class II, BMI 35-39). Appears rested. Appears staged age. Appears in well-developed, well-nourished, and in no acute distress. Does not appear acutely or chronically ill. Appears well groomed.  Head: 1-cm smooth, round, mobile mass in the right parotid gland over the ascending ramus of the mandible. Mild to moderate erythema over the treatment region (below the hairline). Horizontal right forehead scar. No scab. Region appears fully healed. No bleeding. No suspicious masses or skin changes. Eyelid closure remains good. No discharge, bleeding, cellulitis, warmth, crepitus, or tenderness. Natural alopecia pattern. Normocephalic.  Eyes: Anicteric, vision grossly intact.  ENT: Good volume. No hoarseness.  Neck: Symmetric, trachea midline.  Lymphatics: No parotid, peripartoid, cervical, or supraclavicular adenopathy.  Lungs: Easy respirations. No use of accessory musculature.  MSK: Shoulder range  of motion is good. No arm edema or hand edema. Good muscle tone. Good posture.   Integument: No jaundice or pallor.   Neurologic: Alert, oriented, fluent. Memory grossly intact. Motor grossly intact. Sensory grossly intact. No ataxia.  Psychologic: Upbeat, relaxed, confident, engaging, and motivated. Pleasant, cooperative, insightful, and concrete.    Impression   Routine tolerance to radiation therapy. Grade 1 toxicity attributable to radiation therapy.    Plan   1) Mass: Etiology of right parotid mass unclear. Review of treatment planning CT demonstrates a 1-cm node with a fatty hilum and defined contours (benign-appearing) that likely correlates with the palpable mass. Most likely benign, but lies within the drainage pattern of a forehead. Asked to the patient to arrange follow up with surgeon about 1-month after the end of radiation therapy for re-evaluation. Imaging (anatomic, biologic) at this time would document the presence of the palpable mass but would not be likely to assist with diagnostic interpretation.     2) Skin: Grade 1. Wound continues to heal. Again fully closed. Minor erythema is early tolerated. Reviewed routine skin care. Anticipate rapid eventual escalation of skin reaction (erythema, hyperpigmentation, desquamation, and pain) with a peak about 1-2 weeks after the end of treatment.   3) Pain: Grade 0. Anticipate possible use of non-opoid analgesics for pain relief as local toxicities progress.  4) Radiation therapy: No new interventions. Will not treat beyond the hypofractionated dose of 5,000 cGy since a gross or microscopically positive margin was not present. Reviewed again the graphical 3D plan with treatment planning. Have reviewed with patient with attention to dose to the brain and eyes. Reviewed anticipated time course, nature, and potential interventions for managing toxicity during and after radiation therapy. Reviewed discharge and follow up plans. Patient aware of onsite and  telemedicine coverage of our clinic. He wished to proceed as recommended. All questions answered to his satisfaction.     Radiation Oncology Weekly Review   Reviewed available labs and diagnostic studies. Interpreted as adequate to proceed with radiation therapy. Discussed management with Therapy, Treatment Planning, and Nursing. Reviewed weekly routine QA, linac imaging, and clinical set up are adequate to proceed with radiation therapy as prescribed.    Additional Data, Medications, Allergies     Current Outpatient Medications   Medication     Acetaminophen (TYLENOL PO)     No current facility-administered medications for this visit.      Allergies: Patient has no known allergies.      Jay Ross M.D., Ph.D.  Department of Radiation Oncology  Tel: [phone]  Fax: [phone]

## 2021-03-11 ENCOUNTER — APPOINTMENT (OUTPATIENT)
Dept: RADIATION ONCOLOGY | Facility: HOSPITAL | Age: 71
End: 2021-03-11
Payer: COMMERCIAL

## 2021-03-11 ENCOUNTER — RESULTS ONLY (OUTPATIENT)
Dept: RADIATION ONCOLOGY | Facility: HOSPITAL | Age: 71
End: 2021-03-11

## 2021-03-11 LAB
RAD ONC ARIA COURSE ID: NORMAL
RAD ONC ARIA COURSE LAST TREATMENT DATE: NORMAL
RAD ONC ARIA COURSE START DATE: NORMAL
RAD ONC ARIA COURSE TREATMENT ELAPSED DAYS: 24
RAD ONC ARIA FIRST TREATMENT DATE: NORMAL
RAD ONC ARIA PLAN FRACTIONS TREATED TO DATE: 19
RAD ONC ARIA PLAN ID: NORMAL
RAD ONC ARIA PLAN NAME: NORMAL
RAD ONC ARIA PLAN PRESCRIBED DOSE PER FRACTION: 2.5 GY
RAD ONC ARIA PLAN TOTAL FRACTIONS PRESCRIBED: 20
RAD ONC ARIA PLAN TOTAL PRESCRIBED DOSE: 5000 CGY
RAD ONC ARIA REFERENCE POINT DOSAGE GIVEN TO DATE: NORMAL GY
RAD ONC ARIA REFERENCE POINT DOSAGE GIVEN TO DATE: NORMAL GY
RAD ONC ARIA REFERENCE POINT ID: NORMAL
RAD ONC ARIA REFERENCE POINT ID: NORMAL

## 2021-03-11 PROCEDURE — 77412 RADIATION TX DELIVERY LVL 3: CPT | Performed by: RADIOLOGY

## 2021-03-12 ENCOUNTER — APPOINTMENT (OUTPATIENT)
Dept: RADIATION ONCOLOGY | Facility: HOSPITAL | Age: 71
End: 2021-03-12
Payer: COMMERCIAL

## 2021-03-12 ENCOUNTER — DOCUMENTATION ONLY (OUTPATIENT)
Dept: RADIATION ONCOLOGY | Facility: HOSPITAL | Age: 71
End: 2021-03-12

## 2021-03-12 ENCOUNTER — RESULTS ONLY (OUTPATIENT)
Dept: RADIATION ONCOLOGY | Facility: HOSPITAL | Age: 71
End: 2021-03-12

## 2021-03-12 LAB
RAD ONC ARIA COURSE ID: NORMAL
RAD ONC ARIA COURSE LAST TREATMENT DATE: NORMAL
RAD ONC ARIA COURSE START DATE: NORMAL
RAD ONC ARIA COURSE TREATMENT ELAPSED DAYS: 25
RAD ONC ARIA FIRST TREATMENT DATE: NORMAL
RAD ONC ARIA PLAN FRACTIONS TREATED TO DATE: 20
RAD ONC ARIA PLAN ID: NORMAL
RAD ONC ARIA PLAN NAME: NORMAL
RAD ONC ARIA PLAN PRESCRIBED DOSE PER FRACTION: 2.5 GY
RAD ONC ARIA PLAN TOTAL FRACTIONS PRESCRIBED: 20
RAD ONC ARIA PLAN TOTAL PRESCRIBED DOSE: 5000 CGY
RAD ONC ARIA REFERENCE POINT DOSAGE GIVEN TO DATE: NORMAL GY
RAD ONC ARIA REFERENCE POINT DOSAGE GIVEN TO DATE: NORMAL GY
RAD ONC ARIA REFERENCE POINT ID: NORMAL
RAD ONC ARIA REFERENCE POINT ID: NORMAL

## 2021-03-12 PROCEDURE — 77336 RADIATION PHYSICS CONSULT: CPT | Performed by: RADIOLOGY

## 2021-03-12 PROCEDURE — 77427 RADIATION TX MANAGEMENT X5: CPT | Performed by: RADIOLOGY

## 2021-03-12 PROCEDURE — 77412 RADIATION TX DELIVERY LVL 3: CPT | Performed by: RADIOLOGY

## 2021-04-09 ENCOUNTER — MEDICAL CORRESPONDENCE (OUTPATIENT)
Dept: CT IMAGING | Facility: HOSPITAL | Age: 71
End: 2021-04-09

## 2021-04-12 ENCOUNTER — HOSPITAL ENCOUNTER (OUTPATIENT)
Dept: CT IMAGING | Facility: HOSPITAL | Age: 71
Discharge: HOME | End: 2021-04-12
Attending: SURGERY | Admitting: SURGERY
Payer: COMMERCIAL

## 2021-04-12 DIAGNOSIS — C44.320 SQUAMOUS CELL CARCINOMA OF SKIN OF UNSPECIFIED PARTS OF FACE: ICD-10-CM

## 2021-04-12 PROCEDURE — 255N000002 HC RX 255 OP 636: Performed by: RADIOLOGY

## 2021-04-12 PROCEDURE — 70491 CT SOFT TISSUE NECK W/DYE: CPT

## 2021-04-12 RX ORDER — IOPAMIDOL 612 MG/ML
100 INJECTION, SOLUTION INTRAVASCULAR ONCE
Status: COMPLETED | OUTPATIENT
Start: 2021-04-12 | End: 2021-04-12

## 2021-04-12 RX ADMIN — IOPAMIDOL 100 ML: 612 INJECTION, SOLUTION INTRAVENOUS at 15:32

## 2021-04-21 ENCOUNTER — MEDICAL CORRESPONDENCE (OUTPATIENT)
Dept: HEALTH INFORMATION MANAGEMENT | Facility: HOSPITAL | Age: 71
End: 2021-04-21

## 2021-04-27 ENCOUNTER — MEDICAL CORRESPONDENCE (OUTPATIENT)
Dept: PET IMAGING | Facility: HOSPITAL | Age: 71
End: 2021-04-27

## 2021-05-06 ENCOUNTER — TRANSFERRED RECORDS (OUTPATIENT)
Dept: HEALTH INFORMATION MANAGEMENT | Facility: CLINIC | Age: 71
End: 2021-05-06

## 2021-05-10 ENCOUNTER — TRANSFERRED RECORDS (OUTPATIENT)
Dept: HEALTH INFORMATION MANAGEMENT | Facility: CLINIC | Age: 71
End: 2021-05-10
Payer: COMMERCIAL

## 2021-05-13 ENCOUNTER — TRANSFERRED RECORDS (OUTPATIENT)
Dept: HEALTH INFORMATION MANAGEMENT | Facility: CLINIC | Age: 71
End: 2021-05-13

## 2021-05-18 ENCOUNTER — OFFICE VISIT (OUTPATIENT)
Dept: OTOLARYNGOLOGY | Facility: OTHER | Age: 71
End: 2021-05-18
Attending: OTOLARYNGOLOGY
Payer: COMMERCIAL

## 2021-05-18 ENCOUNTER — TRANSFERRED RECORDS (OUTPATIENT)
Dept: HEALTH INFORMATION MANAGEMENT | Facility: CLINIC | Age: 71
End: 2021-05-18

## 2021-05-18 DIAGNOSIS — H72.91 PERFORATION OF RIGHT TYMPANIC MEMBRANE: ICD-10-CM

## 2021-05-18 DIAGNOSIS — Z09 ENCOUNTER FOR FOLLOW-UP EXAMINATION AFTER COMPLETED TREATMENT FOR CONDITIONS OTHER THAN MALIGNANT NEOPLASM: Primary | ICD-10-CM

## 2021-05-18 PROCEDURE — G0463 HOSPITAL OUTPT CLINIC VISIT: HCPCS

## 2021-05-18 NOTE — PROGRESS NOTES
document embedded image  Patient Name: Dino Guzman    Address: [address] [place] [address]     YOB: 1950    [address] [address] [id number]    MR Number: [id number]    Phone: [phone]  PCP: Kuldip Lazo DO            Appointment Date: 21   Visit Provider: Los Hamilton MD    cc: Kuldip Lazo DO; ~    ENT Progress Note  Visit Reasons: Post Op Parotid Mass    HPI  History of Present Illness  Chief complaint:  Postop check    History  The patient is a 62-year-old man who is 8 days out from right parotidectomy.  His past specimen demonstrated 2 lymph nodes with metastatic squamous cell carcinoma present.  He had 3 other lymph nodes that were free of disease.  He has had no problems postoperatively.    Exam  The patient still has weakness in his forehead.  He believes this was secondary to his forehead resection.  Remainder of his facial nerve appears to be working well.  He had noted some hearing loss on the right.  Examination today reveals small perforation centrally in the drum.  There is no active drainage.    Allergies    No Known Allergies Allergy (Verified 05/10/21 08:54)    PFSH  PFSH:   Medical History (Reviewed 21 @ 10:24 by Katie Amaya, Med Assist)    Rupture of left quadriceps tendon  repair lt leg quad    Surgical History (Reviewed 21 @ 10:24 by Katie Amaya, Med Assist)    History of knee surgery  left  S/P excision of skin lesion, follow-up exam  Dr Wesley on 20, forehead lesion    Family History (Reviewed 21 @ 10:24 by Katie Amaya, Med Assist)  Father  Skin abnormalities  Mother     No problems noted.       Social History (Reviewed 21 @ 10:24 by Katie Amaya, Med Assist)  Smoking Status:  Never smoker  how long ago did patient quit smokin yrs- quit before age 42  second hand exposure:  No  alcohol intake:  current alcohol intake frequency: other Alcohol type: beer, wine and hard liquor  substance use type:  does not  use  housing:  house       A&P  Assessment & Plan  (1) Postop check:        Status: Acute        Code(s):  Z09 - Encounter for follow-up examination after completed treatment for conditions other than malignant neoplasm  (2) Tympanic membrane perforation:        Status: Acute        Code(s):  H72.90 - Unspecified perforation of tympanic membrane, unspecified ear        Qualifiers:          Laterality: right  Qualified Code(s): H72.91 - Unspecified perforation of tympanic membrane, right ear  Additional Plan Details  Plan Details: I have asked the patient to follow-up in a month for a final postop check and to reassess his right tympanic membrane.  I will contact his radiation therapist regarding radiation to the parotid bed and cervical lymph nodes postoperatively.      Los Hamilton MD    05/18/21 1224    <Electronically signed by Los Hamilton MD> 05/19/21 2731

## 2021-05-20 ENCOUNTER — ONCOLOGY VISIT (OUTPATIENT)
Dept: RADIATION ONCOLOGY | Facility: HOSPITAL | Age: 71
End: 2021-05-20
Attending: RADIOLOGY
Payer: COMMERCIAL

## 2021-05-20 VITALS
HEART RATE: 80 BPM | BODY MASS INDEX: 40.48 KG/M2 | SYSTOLIC BLOOD PRESSURE: 144 MMHG | DIASTOLIC BLOOD PRESSURE: 68 MMHG | RESPIRATION RATE: 16 BRPM | WEIGHT: 257.9 LBS | HEIGHT: 67 IN

## 2021-05-20 DIAGNOSIS — D16.01 ENCHONDROMA OF RIGHT HUMERUS: ICD-10-CM

## 2021-05-20 DIAGNOSIS — C07 PRIMARY MALIGNANT NEOPLASM OF PAROTID GLAND (H): Primary | ICD-10-CM

## 2021-05-20 PROCEDURE — 99205 OFFICE O/P NEW HI 60 MIN: CPT | Performed by: RADIOLOGY

## 2021-05-20 PROCEDURE — G0463 HOSPITAL OUTPT CLINIC VISIT: HCPCS | Performed by: RADIOLOGY

## 2021-05-20 PROCEDURE — 99417 PROLNG OP E/M EACH 15 MIN: CPT | Performed by: RADIOLOGY

## 2021-05-20 ASSESSMENT — ENCOUNTER SYMPTOMS
ENDOCRINE NEGATIVE: 1
NEUROLOGICAL NEGATIVE: 1
CARDIOVASCULAR NEGATIVE: 1
MUSCULOSKELETAL NEGATIVE: 1
HEMATOLOGIC/LYMPHATIC NEGATIVE: 1
PSYCHIATRIC NEGATIVE: 1
CONSTITUTIONAL NEGATIVE: 1
RESPIRATORY NEGATIVE: 1
EYES NEGATIVE: 1
GASTROINTESTINAL NEGATIVE: 1

## 2021-05-20 ASSESSMENT — PAIN SCALES - GENERAL: PAINLEVEL: MILD PAIN (3)

## 2021-05-20 ASSESSMENT — MIFFLIN-ST. JEOR: SCORE: 1898.46

## 2021-05-20 NOTE — PROGRESS NOTES
"[facility]  DEPARTMENT OF RADIATION ONCOLOGY  TREATMENT SUMMARY NOTE    Name: Dino Guzman MRN: [id number]   : 1950 (70 year old)  Date of Service: Mar 12, 2021 Referring: Dr. Wesley     Diagnosis and Cancer Staging  Squamous cell carcinoma of forehead  Staging form: Cutaneous Squamous Cell Carcinoma, AJCC 8th Edition  - Clinical stage from 2021: Stage II (cT2, cN0, cM0) - Signed by Jay Ross MD on 2021      Radiation Therapy   Treatment intent: Curative.  Treatment site: Right forehead.  Delivery method: En face electrons.  Energy: 6 MV.  Dose: 20 fractions of 250 cGy each for 5,000 cGy.  Elapsed calendar days: 25 elapsed days (2/15/2021-3/12/2021).  Completion date: Mar 12, 2021.    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 Following expert resection of the lesion, surgical oncology referred the patient for adjuvant radiation therapy to improve local control (Primary: Within the center of a 7-cm specimen, a 2.7 cm moderately differentiated invasive squamous cell carcinoma with extension through the skeletal muscle and into the underlying adipose tissue. The deep and peripheral margins were negative (deep margin presumably was minimal). Perineural invasion present; lymphatic invasion absent. Nodes: cN0). Among the patient's pertinent circumstances are steady but relatively slow healing of the flap reconstruction (good cosmesis) from his surgery on 2020.     Recent History   The patient demonstrated routine tolerance to radiation therapy with grade 1 toxicity of the forehead skin. He did not develop significant desquamation and required only routine topical care. We anticipate a moderate degree of additional toxicity with the likely peak of acute reactions about 1-2 weeks after the end of radiation therapy. Due to the ongoing COVID-19 pandemic, nursing will contact the " patient during this interval for a virtual evaluation and possible escalation of symptomatic care if indicated. We will ask the patient to come to clinic if formal physical evaluation is deemed required.    On the second to last day of treatment, the patient reported a palpable mass in the right parotid region. The lesion diminished in size overnight. Retrospective review of his treatment planning CT images identified a 1-cm intraparotid node with a fatty hilum and well-defined contours. Review of the images with radiology favored a benign etiology. We counseled the patient to follow-up with his surgeon within a month of radiation therapy for possible aspiration of the lesion. Follow-up with me would be determined by the decisions of the surgeon. We counseled the patient to see his primary care service for general and maintenance health issues. He wished to proceed as recommended. We answered all questions answered to his satisfaction. Thank you for allowing us to care for this very pleasant patient.    Outpatient Encounter Medications as of 3/12/2021   Medication Sig Dispense Refill     Acetaminophen (TYLENOL PO) Take 1,500 mg by mouth       No facility-administered encounter medications on file as of 3/12/2021.     No orders of the defined types were placed in this encounter.    Allergies: Patient has no known allergies.      Jay Ross M.D., Ph.D.  Department of Radiation Oncology  Tel: [phone]  Fax: [phone]    cc:  Christy Wesley M.D. (breast surgery)  Kuldip Lazo D.O. (medicine)

## 2021-05-20 NOTE — PROGRESS NOTES
"[facility]  DEPARTMENT OF RADIATION ONCOLOGY  FOLLOW-UP CONSULTATION NOTE    Name: Dino Guzman MRN: [id number]   : 1950 (70 year old)  Date of Service: May 20, 2021 Referring: Dr. 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 Following parotidectomy, head & neck surgery refer the patient for consultation about the role of additional radiation therapy to the head & neck region. The primary site remains controlled (Stage: Initial [id number], Recurrent [id number]. Primary: 2.7-cm right forehead with extension through skeletal muscle into underlying adipose tissue but negative margin against the skull. Nodes: 1.9-cm and 1.4-cm.intraparotid nodes abutting the inked margins but with microscopically negative margins). Forehead resection was performed on 2020. Forehead radiation therapy was completed on 3/12/2021 (5000 cGy in 20 fractions of 250 cGy each. Parotidectomy formed on 5/10/2021. A preoperative PET-CT demonstrated hypermetabolic activity of uncertain significance in the right humerus, bilateral hilum, and bilateral mediastinum. Among the patient's additional pertinent circumstances and competing toxicity risks are mild residual forehead and supraorbital numbness weakness, absence of clinically significant xerostomia, and full dentures (one remain tooth-contralateral upper molar). Recent alteration of right ear hearing is attributed to a small perforation " of the eardrum (unknown incident).    5/6/2021 PET-CT      History of Present Illness   The patient's oncologic history across multiple institutions is briefly abstracted as follows:    Surgery:    12/9/2020 Surgical oncology consultation: Presented with a 3-month history of an enlarging right forehead mass that eventually developed into an ulcerative wound. Physical examination noted an approximately 3-cm raised ulcerated forehead lesion. Biopsy was not diagnostic of malignancy (necrotic tissue). Therefore recommended wide local excision for diagnostic and therapeutic purposes for disease that was clinically localized.    12/14/2020 Wide local excision of the right forehead: Operative note described an uncomplicated procedure. The wide resection yielded 7-cm forehead specimen containing a centrally located 2.7-cm moderately differentiated invasive squamous cell carcinoma. The disease was clinically fixed to the scale, but resection margins were negative (deep and peripheral). Closure required advancement of the flap.    Radiation therapy:    2/15/2021 Started right forehead radiation therapy: Operative bed treated with electrons with moderate hypofractionation to a total dose of 5,000 cGy in 20 fractions of 250 cGy each. Elective dinesh irradiation omitted due to the absence of satellite lesions and the large treatment volume required to encompass tissues lying between the forehead site and nodes (periparotid, parotid, and neck) as well as the nodes.     3/12/2021 Completed right forehead radiation therapy: On the second to the last day of treatment, the patient noticed a mass in the right submandibular gland. The lesion diminished in size overnight. Review of our treatment planning CT identified a 1-cm intraparotid node. Imaging review with radiology favored a benign node due to fatty hilum and well-defined contours. Patient counseled to see his surgeon for further evaluation of the mass. Scheduling logistics  during the COVID-19 pandemic delayed the visit until 4/9/2021.    Follow up:    4/9/2021 Surgical oncology follow-up: Evaluated for right parotid mass that had progressively enlarged since completion of radiation therapy.    4/12/2021 CT soft tissue neck with contrast: Right parotid gland with 2 enhancing nodules measuring approximately 1.7 and 1.5-cm. Mild fat stranding. Around a portion of the parotid gland. No suspicious regional adenopathy.    4/21/2021 Head & neck surgery consultation: Physical examination noted two suspicious palpable nodules in the right parotid gland and no palpable cervical adenopathy. Recommended parotidectomy for diagnostic and therapeutic purposes.    5/6/2021 PET-CT: Right parotid gland with 2 hypermetabolic nodules measuring approximately 2-cm and 1.3-cm. SUV maximum of 14.7 and 6.0, respectively. Finding suggestive malignant disease (metastatic or primary). Right proximal humerus with a mixed sclerotic/lytic, mildly hypermetabolic lesion measuring 5-cm.finding potentially represents a low-grade cartilaginous tumor versus metastatic disease. Relatively symmetric mediastinum with bilateral hilar and mediastinal nodes measuring up to 1-cm in short axis. SUV maximum of 5.0. Significance unclear unclear.    5/10/2021 Right parotidectomy: The operative note described preservation of the major branches of the facial nerve. Resection of the parotid required transection of the greater auricular nerve. The 31-gram specimen yielded metastatic squamous cell carcinoma (moderately differentiated, necrotic) involving 2 of 5 intraparotid nodes. The lesions measured 1.9-cm and 1.4-cm. Inked margins microscopically negative. Margin with not defined, but disease grossly abutted to inked margins (blue, black). Completion of the procedure were required same day-evacuation of hematoma (300 cc of clot).    We reviewed other conditions that can influence the choice of therapy and its morbidity. The patient  is pleased with his recovery from surgery. He plans to soon return to work. He has regained the ability to whistle. He denies drooling, spillage of food, or difficulties with mastication. He has mild right jaw region pain when chewing. He did not require an analgesic after surgery and is steadily advancing his diet (lost 8-lb while on a liquid diet). He has mild numbness of the right cheek. The right neck/chest wall hematoma has resolved almost completely. He denies new masses in the head & neck region. He reports recent change in hearing that has been attributed by ENT to a small perforation of the right eardrum (follow-up is pending). The patient remains pleased with his right eyelid function. The eyelid is slightly raised relative to the left side. Nevertheless, the patient can easily close the eye sufficiently well to sleep comfortably and avoid a frequent feeling of dryness. He denies headache, nausea, or vomiting. He confirms that he is nearly edentulous. He has upper and lower dentures, and the sole remaining tooth is a left upper molar. The patient verify that he has discussed with his medical team the distant PET-CT findings of hypermetabolic activity of the right humerus and the mediastinum/hilum, neither of which are felt to be related to his head & neck cancer or require immediate intervention. The patient denies dyspnea, cough, or hemoptysis. He denies right shoulder or arm pain.    Chemotherapy History: No.  Radiation History: Yes (as above).  Implanted Cardiac Devices: No.  Implanted Chest Wall Infusion Port: No.    Next 5 appointments (look out 90 days)    May 20, 2021 10:30 AM  CONSULT with Jay Ross MD  [facility] ([facility] ) [address] [place] [address] [id number] [phone]        Past Medical History:   Diagnosis Date     Cancer (H)     squamous cell cancer of right forehead     Past Surgical History:   Procedure Laterality Date     OTHER SURGICAL  HISTORY Right     exicsion of forehead     OTHER SURGICAL HISTORY      repair of left quadracep (slipped on the ice)       Outpatient Encounter Medications as of 5/20/2021   Medication Sig Dispense Refill     Acetaminophen (TYLENOL PO) Take 1,500 mg by mouth       No facility-administered encounter medications on file as of 5/20/2021.     No orders of the defined types were placed in this encounter.    Allergies: Patient has no known allergies.    Social History     Tobacco Use     Smoking status: Former Smoker     Packs/day: 20.00     Smokeless tobacco: Never Used   Substance Use Topics     Alcohol use: Yes     Comment: rare     Drug use: Not on file     Family History   Problem Relation Age of Onset     Cancer No family hx of    No other cancers in first or second degree relatives.    Review of Systems (supplemental to the ROS documented in the EMR and the HPI)   Review of Systems - OncologyPain: Data Unavailable.    Physical Exam   KPS: 90.  ECOG Status: 0 (Fully active, able to carry on all activities without restriction)  Vitals: There were no vitals taken for this visit.    Clinical Examination:  General: Appears clinically stable. Appears in general health. Morbidly obese (class III, BMI 40 or greater). Appears rested. Appears stated age. Appears well-developed, well-nourished, and in no acute distress. Does not appear acutely or chronically ill. Appears well groomed.  Head: Right forehead has a well-healed surgical scar. Excellent surgical cosmesis. No residual erythema, hyperpigmentation or desquamation at the site of radiation therapy. Right parotid/periauricular region has a well-healed surgical parotidectomy scar. Scar has no discharge, purulence, tenderness, crepitus, or cellulitis. No masses, satellite lesions, or suspicious skin changes over the forehead,, parotid region, or intervening tissues on the right side and the left side.    Eyes: Nominal elevation of the right eyelid. Good closure with  minimal effort. Degree of closure was slightly less than the contralateral side but nevertheless complete. Adequate tear production bilaterally. Sclera not injected. Anicteric.  ENT: Good volume. No hoarseness.  Neck: Nominal residual right neck skin changes from nearly resolved hematoma (diffuse erythema). Soft, supple, symmetric, trachea midline.  Lymphatics: No parotid, peripartoid, cervical, or supraclavicular adenopathy.  Chest/Breasts: Single focus of resolving hematoma of the right upper chest wall. No port. No implanted cardiac device.   Lungs: Clear to auscultation, easy respirations.  Cardiovascular: Regular rate. No jugulovenous distension.  MSK: Shoulder range of motion is good. No arm edema or hand edema. Good muscle tone. Good posture. No tenderness on palpation. No cords or calf tenderness.  Integument: No jaundice or pallor. No cellulitis. No ecchymosis.  Neurologic: Alert, oriented, fluent. Memory intact. Motor intact. Sensory intact. No ataxia.  Psychologic: Well spoken about his cancer and therapies. Clear, consistent thoughts and opinions. Led part of the discussion. Good level of critical thinking and intelligence. Good comprehension and processing of new information. Did not require repeating of questions or answers. Progressively complex discussions and questions. Well constructed answers. Complex speech and though patterns. Appropriately anxious about radiation therapy and sad about diagnosis. Pleasant, cooperative, insightful, concrete, and good judgment.    Time on Day of Encounter, and MDM Data Reviewed and Analyzed on Day of Encounter   Time spent on patient activities is based on Chart review 109 minutes, Visit 28 minutes, and Documentation 14 minutes. Total time: 151 minutes.    MDM based on:       High risk element:  o Delayed no metastasis from squamous cell carcinoma of the forehead. Pending head & neck radiation therapy.       Tests, documents, or independent historian(s) analyses  include but are not limited to:  o Those referenced above in the bulleted HPI.       Independent Interpretations of tests include but are not limited to:  o PET-CT review as noted above.  o Ordered right humerus MRI.  o Ordered CT simulation.    Information Review   I reviewed the case with the patient, evaluated him, and discussed his postoperative salvage treatment options and risks of therapy. I reviewed the medical records, radiographic information, and pathologic studies. I reviewed the imaging studies via PACS and with the patient (PET-CT, etc). I reviewed the case with otolaryngology.I reviewed the nursing and patient intake sheets. I offered to speak with his family members and friends today by speakerphone. The patient declined my offer but granted me permission to speak with them if they contact me or come to clinic. The patient is a good historian, began the visit with good insights into the disease process, and acknowledged the potentially poor consequences of his disease. He educated himself through a variety of educational media including the Internet. He demonstrated good comprehension of our discussion and explored the treatment options with good understanding. The patient asked pertinent questions and insightful follow-up questions. I illustrated the basic anatomy and field of treatment. We discussed the onsite and telemedicine coverage of our clinic. He declined referral for an additional opinion or conservative care. He previously accepted referral to our social work staff for additional resources including potential counseling. The patient granted me permission to exchange medical information and records with other physicians. No therapeutic radiation protocols for the patient's disease are available at our Center. The patient is not interested in referral to medical oncology.    We discussed the fact that his regional node metastasis significantly diminishes his prognosis and likelihood of cure.  We discussed the potentially beneficial role of postoperative radiation therapy in the context of evolving standards and national guidelines of oncologic care such as the NCCN, ACR, and MONA as well as management during the COVID-19 pandemic. Systemic radiation therapy given either concurrently or sequentially is not routinely used for squamous cell carcinomas of the skin of the head & neck region even when regional dinesh metastases occur during the initial portion of management or as a delayed recurrence. We discussed the relative risks, benefits, rationale, potential side effects, alternatives, and adjuncts to radiation therapy to the parotid, periparotid, and ipsilateral cervical nodes following parotidectomy with preservation of major branches of the facial nerves. Toxicities can be acute or chronic, and will negatively impact his long-term quality of life. They can require high levels of supportive care and breaks in radiation therapy. I anticipate a moderate degree of acute mucosal, salivary, and skin toxicity and potentially severe skin toxicity around the inferior portion of the ear and soft tissue indentations around the parotid scar and posterior mandibular region. The patient's subclinical xerostomia due to parotidectomy will increase the likelihood of bothersome xerostomia from radiation therapy. The toxicities include but are not limited to the skin and mucosa (erythema, hyperpigmentation, pruritus, desquamation, breakdown, etc), salivary apparatus (temporary and chronic xerostomia), gustatory and olfactory apparatus (anorexia, dysgeusia, aguesia, dysnomia, anomia, dehydration, impaired nutrition, etc), swallowing apparatus (aspiration, dysphagia, etc), speech apparatus (hoarseness, laryngitis, vocal loss, etc), lymphatics (lymphedema,. etc), soft tissues and bones of the head & neck region (necrosis, fibrosis, arthritis, etc), neurologic systemic (neuropathy, transverse myelitis, brachial plexopathy,  etc), vascular system (carotid artery or jugular hemorrhage, stenosis, etc), thyroid (hypothyroidism), pain, infection, and other organs as well as systemic toxicities (e.g., fatigue) and long-term risks such as second malignancy.    Assessment    In summary, I favor postoperative radiation therapy to the right parotid bed and ipsilateral neck. The goal is to improve regional control in the setting of multiple (2) resected positive nodes (intraparotid) and close but microscopically negative margins around the positive nodes. I would not treat the facial soft tissues lying between the resected forehead disease and the resected parotid node disease. I feel that the forehead primary site is sufficiently lateral to omit radiation therapy to the contralateral neck and forehead regions. I do not feel that adjuvant chemotherapy is indicated on an off protocol basis. I feel that the PET-CT findings of the bilateral hilum and mediastinum are not likely due to metastatic cancer originating from the forehead. At present, the patient has no signs or symptoms of a hematological malignancy requiring acute intervention. Follow-up imaging after completion of radiation therapy will re-evaluate the chest. I feel that the PET-CT finding of abnormal uptake in the right humerus most likely represents a benign lesion such as enchondroma. A baseline MRI will be obtained prior to consideration of referral to orthopedic oncology. The patient was proceed as recommended. We therefore obtained written consent for radiation therapy to the right head & neck region. We answered all questions to his satisfaction. Thank you for allowing us to participate in the care of this very pleasant patient.    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 Orthopedic oncology referral as indicated.  2) Bilateral mediastinal and hilar adenopathy:  3) Radiation therapy  simulation: Patient given a written simulation appointment for 6/2/2021. The patient is edentulous and does not require formal dental clearance.  4) Radiation therapy treatment: Anticipating treating the ipsilateral (right) parotid region and neck with conventionally fractionated radiation therapy. We will consider boosting the site of the involved intraparotid nodes due to the clinically close margins.  5) COVID-19: Discussed the potential impact of the pandemic on his treatment and our ability to deliver therapy. Discussed current strategies and approaches currently being used in our Department to treat positive, negative, and suspected patients regarding COVID-19.    Jay Ross M.D., Ph.D.  Department of Radiation Oncology  Tel: [phone]  Fax: [phone]    cc:  Los Hamilton M.D. (otolaryngology)  Christy Wesley M.D. (breast surgery)  Kuldip Lazo D.O. (medicine)

## 2021-05-20 NOTE — PROGRESS NOTES
"[facility]  DEPARTMENT OF RADIATION ONCOLOGY  INITIAL PATIENT ASSESSMENT    Name: Dino Guzman MRN: [id number]   : 1950 (70 year old)  Date of Service: 2021   Referring: Los Hamilton       Other Physicians: Kuldip Lazo    Diagnosis: Cancer of right parotid    Chief Complaint   Patient presents with     Radiation Therapy       Initial BP (!) 144/68 (BP Location: Left arm, Patient Position: Chair, Cuff Size: Adult Large)   Pulse 80   Resp 16   Ht 1.702 m (5' 7\")   Wt 117 kg (257 lb 14.4 oz)   BMI 40.39 kg/m   Estimated body mass index is 40.39 kg/m  as calculated from the following:    Height as of this encounter: 1.702 m (5' 7\").    Weight as of this encounter: 117 kg (257 lb 14.4 oz).  Medication Reconciliation: complete      Prior radiation therapy:   Site Treated: right frontal region  Facility: [facility] [place]  Dates: 2/15/21-3/12/21  Dose: 5000 cGy    Prior chemotherapy: None    Prior hormonal therapy:N/A    Pain Eval:  Current history of pain associated with this visit:   Intensity: 3/10  Current: aching and I can feel it stretch  Location: right ear  Treatment: no treatment needed    Psychosocial  Marital Status:    Spouse/Significant other: Luz Elena   Children: 2   Occupation: works in office at [employer]    Retired: No  Living arrangements: lives with spouse  Do you feel safe at home? Yes  Activity status: independent   referral needs: Not needed    Advanced Directive: No      Review of Systems   Constitutional: Negative.    HENT:   Positive for hearing loss.    Eyes: Negative.    Respiratory: Negative.    Cardiovascular: Negative.    Gastrointestinal: Negative.    Endocrine: Negative.    Genitourinary: Negative.     Musculoskeletal: Negative.    Skin: Negative.    Neurological: Negative.    Hematological: Negative.    Psychiatric/Behavioral: Negative.        Patient was assessed for the influenza, pneumo-poly, prevnar 13, Tdap, and shingles " "immunizations. \"Vaccinations and Cancer Treatment\" flyer given.  Instructed patient to discuss vaccination status with his/her PCM.     Patient was assessed using the NCCN psychosocial distress thermometer. Patient rated the score as a 0. Patient rated current stressors as NA. Stressors will be brought to the attention of provider or Oncology RN Care Coordinator for a score of 6 or greater or per nurses discretion.     Pt is here today for a consult for radiation therapy for cancer of the right parotid.  Educated patient on the mapping process and the possible side effects of XRT to the right parotid, to include: skin changes, hair loss, fullness in right ear, dry mouth, and fatigue.  Pt verbalizes an understanding and has no questions at this time. Pt is accompanied by self, today.   Maribel Cai RN          "

## 2021-05-24 ENCOUNTER — TELEPHONE (OUTPATIENT)
Dept: RADIATION ONCOLOGY | Facility: HOSPITAL | Age: 71
End: 2021-05-24

## 2021-06-02 ENCOUNTER — ONCOLOGY VISIT (OUTPATIENT)
Dept: RADIATION ONCOLOGY | Facility: HOSPITAL | Age: 71
End: 2021-06-02
Attending: RADIOLOGY
Payer: COMMERCIAL

## 2021-06-02 DIAGNOSIS — C44.329 SQUAMOUS CELL CARCINOMA OF FOREHEAD: Primary | Chronic | ICD-10-CM

## 2021-06-02 PROCEDURE — 77334 RADIATION TREATMENT AID(S): CPT | Performed by: RADIOLOGY

## 2021-06-02 PROCEDURE — 77263 THER RADIOLOGY TX PLNG CPLX: CPT | Performed by: RADIOLOGY

## 2021-06-02 PROCEDURE — 77334 RADIATION TREATMENT AID(S): CPT | Mod: 26 | Performed by: RADIOLOGY

## 2021-06-02 PROCEDURE — 77470 SPECIAL RADIATION TREATMENT: CPT | Performed by: RADIOLOGY

## 2021-06-02 PROCEDURE — 77470 SPECIAL RADIATION TREATMENT: CPT | Mod: 26 | Performed by: RADIOLOGY

## 2021-06-02 NOTE — PROGRESS NOTES
"  [facility]  DEPARTMENT OF RADIATION ONCOLOGY  SIMULATION NOTE    Name: Dino Guzman MRN: [id number]   : 1950 (70 year old)  Date of Service: 2021 Referring: Dr. 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 Following parotidectomy, head & neck surgery referred the patient for additional radiation therapy to the head & neck region. The primary site remains controlled (Stage: Initial [id number], Recurrent [id number]. Primary: 2.7-cm right forehead with extension through skeletal muscle into underlying adipose tissue but negative margin against the skull. Nodes: 1.9-cm and 1.4-cm.intraparotid nodes abutting the inked margins but with microscopically negative margins). Forehead resection was performed on 2020. Forehead radiation therapy was completed on 3/12/2021 (5000 cGy in 20 fractions of 250 cGy each. Right parotid mass appeared on the second to last day of radiation therapy. A PET-CT on 2021 demonstrated hypermetabolic activity of uncertain significance in the right humerus, bilateral hilum, and bilateral mediastinum.  A parotidectomy on 5/10/2021 yielded the node disease as above. Among the patient's additional pertinent circumstances and competing toxicity risks are lymphedema above the parotid scar, mild residual forehead and supraorbital numbness weakness, absence of clinically significant xerostomia, and full dentures (one remain " tooth-contralateral upper molar). Recent alteration of right ear hearing is attributed to a small perforation of the eardrum (unknown incident).  5/6/2021 PET-CT      Procedure   The patient comes to clinic for simulation and radiation therapy for an intraparotid node recurrence of squamous cell carcinoma of the right forehead (curative intent). His only complaint is soft buldge that emerged superior to the surgical scar and reminiscent of the postoperative hematoma. He reports that hearing and discomfort of the right ear has improved without an intervention (follow up is pending with ENT for the ear drum perforation of unknown etiology). He still feels pressure and mild discomfort near the ascending ramus of the mandible when opening his mouth (not a barrier to eating). He denies new masses of the head & neck region. He wished to proceed with simulation and treatment.     With the patient, we verified his identification, consent, site, and side of treatment. Daily COVID-19 screening negative for barrier to proceeding with radiation therapy. KPS: 80. On examination, he appears stable and in no acute distress. ENT has normal lip color and no hoarseness. The right forehead surgical and radiation the site continues to have good cosmesis, good wound healing, and mild diffuse skin changes following surgery and radiation therapy. Skin color is nearly normal. No masses or suspicious skin changes of the forehead, face, and parotid/periparotid region. Lymphadenopathy is absent in the parotid, periparotid, cervical, and supraclavicular regions. Lungs has no cough, easy respirations that are regular, unlabored, and no use of accessory musculature. Abdomen and pelvis are soft and non-distended. Neuro is alert, oriented, nonfocal except for minimal elevation of the the right forehead and upper eyelid.. Psych is pleasant, cooperative, insightful, concrete.      Right retromandibular scar: Lymphedema superior to the scar (mild  bulging, 6-cm superior/inferior).    Right forehead: Well headed operative site and radiation therapy region.    With the staff, we reviewed the patient's recent images including the PET-CT. We evaluated multiple positions for setup and treatment, and elected to simulate the patient in a modified supine position. We used orthogonal lasers to align him with the CT simulator. We immobilized the patient with a customized thermoplastic facemask and shoulder mask (the prior facemask was used to establish the neck position) to improve the reproducibility and safety for daily radiation therapy. We placed radiopaque markers to assist in identifying topographical landmarks for simulation. We obtained  and axial CT imaging through the head & neck region. Therapy, treatment planning, and I used virtual simulation techniques to verify the adequacy of the CT images and to create a preliminary isocenter for setup of treatment. We placed marks on the mask to delineate the isocenter. He tolerated the procedure well and without complications.    We will use available diagnostic (e.g., PET-CT for image fusion) and radiation therapy imaging studies for CT-based treatment planning. I anticipate utilizing a form of 3-dimension conformal radiation therapy or 2-D therapy to develop a computerized treatment plan whose dosimetric analysis (e.g., dose-volume histogram (DVH)) indicates adequate coverage of target tissues and sparing of nearby normal structures (e.g., brain, spinal cord, cochlea, etc). We will complete routine QA procedures. I do not anticipate the use of a boost/cone down plan. The patient wished to proceed as recommended. We answered all questions to his satisfaction.    Special Treatment Procedure   Special Treatment Procedure is based on:  o Delivery of radiation therapy will require additional time, effort, and resources due to administration of prior ipsilateral head radiation therapy.     Treatment planning will  require a Special Physics consult due to the prior radiation therapy near the pending field of treatment to minimize the risk of unintentional excessive overlapping of fields.      Jay Ross M.D., Ph.D.  Department of Radiation Oncology  Tel: [phone]  Fax: [phone]

## 2021-06-04 ENCOUNTER — HOSPITAL ENCOUNTER (OUTPATIENT)
Dept: MRI IMAGING | Facility: HOSPITAL | Age: 71
Discharge: HOME | End: 2021-06-04
Attending: RADIOLOGY | Admitting: RADIOLOGY
Payer: COMMERCIAL

## 2021-06-04 ENCOUNTER — DOCUMENTATION ONLY (OUTPATIENT)
Dept: RADIATION ONCOLOGY | Facility: HOSPITAL | Age: 71
End: 2021-06-04

## 2021-06-04 DIAGNOSIS — D16.01 ENCHONDROMA OF RIGHT HUMERUS: ICD-10-CM

## 2021-06-04 PROCEDURE — A9585 GADOBUTROL INJECTION: HCPCS | Performed by: RADIOLOGY

## 2021-06-04 PROCEDURE — 73220 MRI UPPR EXTREMITY W/O&W/DYE: CPT | Mod: RT

## 2021-06-04 PROCEDURE — 255N000002 HC RX 255 OP 636: Performed by: RADIOLOGY

## 2021-06-04 RX ORDER — GADOBUTROL 604.72 MG/ML
10 INJECTION INTRAVENOUS ONCE
Status: COMPLETED | OUTPATIENT
Start: 2021-06-04 | End: 2021-06-04

## 2021-06-04 RX ADMIN — GADOBUTROL 10 ML: 604.72 INJECTION INTRAVENOUS at 08:57

## 2021-06-04 NOTE — PROGRESS NOTES
Radiation Oncology - Telephone Call (6:26 pm)      Today's right humerus MRI Images and report continue to support the diagnosis of enchondroma of the right humerus. Patient notified of favorable results. Study will serve as a baseline reference. Will continue to observe and proceed with radiation therapy to the right parotid region/uneck for dinesh metastatic disease from squamous cell carcinoma of the right forehead. Patient wished to proceed as recommended. All questions answered all questions to his satisfaction. Report cc'd to multidisciplinary oncology team.      Jay Ross M.D., Ph.D.  Department of Radiation Oncology  Tel: [phone]  Fax: [phone]

## 2021-06-09 PROCEDURE — 77301 RADIOTHERAPY DOSE PLAN IMRT: CPT | Performed by: RADIOLOGY

## 2021-06-09 PROCEDURE — 77300 RADIATION THERAPY DOSE PLAN: CPT | Performed by: RADIOLOGY

## 2021-06-09 PROCEDURE — 77338 DESIGN MLC DEVICE FOR IMRT: CPT | Performed by: RADIOLOGY

## 2021-06-09 PROCEDURE — 77300 RADIATION THERAPY DOSE PLAN: CPT | Mod: 26 | Performed by: RADIOLOGY

## 2021-06-09 PROCEDURE — 77301 RADIOTHERAPY DOSE PLAN IMRT: CPT | Mod: 26 | Performed by: RADIOLOGY

## 2021-06-09 PROCEDURE — 77338 DESIGN MLC DEVICE FOR IMRT: CPT | Mod: 26 | Performed by: RADIOLOGY

## 2021-06-09 PROCEDURE — 77370 RADIATION PHYSICS CONSULT: CPT | Performed by: RADIOLOGY

## 2021-06-10 ENCOUNTER — APPOINTMENT (OUTPATIENT)
Dept: RADIATION ONCOLOGY | Facility: HOSPITAL | Age: 71
End: 2021-06-10
Payer: COMMERCIAL

## 2021-06-10 ENCOUNTER — RESULTS ONLY (OUTPATIENT)
Dept: RADIATION ONCOLOGY | Facility: HOSPITAL | Age: 71
End: 2021-06-10

## 2021-06-10 LAB
RAD ONC ARIA COURSE ID: NORMAL
RAD ONC ARIA COURSE LAST TREATMENT DATE: NORMAL
RAD ONC ARIA COURSE START DATE: NORMAL
RAD ONC ARIA COURSE TREATMENT ELAPSED DAYS: 0
RAD ONC ARIA FIRST TREATMENT DATE: NORMAL
RAD ONC ARIA PLAN FRACTIONS TREATED TO DATE: 1
RAD ONC ARIA PLAN ID: NORMAL
RAD ONC ARIA PLAN NAME: NORMAL
RAD ONC ARIA PLAN PRESCRIBED DOSE PER FRACTION: 2 GY
RAD ONC ARIA PLAN TOTAL FRACTIONS PRESCRIBED: 28
RAD ONC ARIA PLAN TOTAL PRESCRIBED DOSE: 5600 CGY
RAD ONC ARIA REFERENCE POINT DOSAGE GIVEN TO DATE: NORMAL GY
RAD ONC ARIA REFERENCE POINT ID: NORMAL

## 2021-06-10 PROCEDURE — 77386 HC IMRT TREATMENT DELIVERY, COMPLEX: CPT | Performed by: RADIOLOGY

## 2021-06-11 ENCOUNTER — RESULTS ONLY (OUTPATIENT)
Dept: RADIATION ONCOLOGY | Facility: HOSPITAL | Age: 71
End: 2021-06-11

## 2021-06-11 ENCOUNTER — APPOINTMENT (OUTPATIENT)
Dept: RADIATION ONCOLOGY | Facility: HOSPITAL | Age: 71
End: 2021-06-11
Payer: COMMERCIAL

## 2021-06-11 LAB
RAD ONC ARIA COURSE ID: NORMAL
RAD ONC ARIA COURSE LAST TREATMENT DATE: NORMAL
RAD ONC ARIA COURSE START DATE: NORMAL
RAD ONC ARIA COURSE TREATMENT ELAPSED DAYS: 1
RAD ONC ARIA FIRST TREATMENT DATE: NORMAL
RAD ONC ARIA PLAN FRACTIONS TREATED TO DATE: 2
RAD ONC ARIA PLAN ID: NORMAL
RAD ONC ARIA PLAN NAME: NORMAL
RAD ONC ARIA PLAN PRESCRIBED DOSE PER FRACTION: 2 GY
RAD ONC ARIA PLAN TOTAL FRACTIONS PRESCRIBED: 28
RAD ONC ARIA PLAN TOTAL PRESCRIBED DOSE: 5600 CGY
RAD ONC ARIA REFERENCE POINT DOSAGE GIVEN TO DATE: NORMAL GY
RAD ONC ARIA REFERENCE POINT ID: NORMAL

## 2021-06-11 PROCEDURE — 77014 PR CT GUIDE FOR PLACEMENT RADIATION THERAPY FIELDS: CPT | Mod: 26 | Performed by: RADIOLOGY

## 2021-06-11 PROCEDURE — 77386 HC IMRT TREATMENT DELIVERY, COMPLEX: CPT | Performed by: RADIOLOGY

## 2021-06-14 ENCOUNTER — APPOINTMENT (OUTPATIENT)
Dept: RADIATION ONCOLOGY | Facility: HOSPITAL | Age: 71
End: 2021-06-14
Payer: COMMERCIAL

## 2021-06-14 ENCOUNTER — RESULTS ONLY (OUTPATIENT)
Dept: RADIATION ONCOLOGY | Facility: HOSPITAL | Age: 71
End: 2021-06-14

## 2021-06-14 LAB
RAD ONC ARIA COURSE ID: NORMAL
RAD ONC ARIA COURSE LAST TREATMENT DATE: NORMAL
RAD ONC ARIA COURSE START DATE: NORMAL
RAD ONC ARIA COURSE TREATMENT ELAPSED DAYS: 4
RAD ONC ARIA FIRST TREATMENT DATE: NORMAL
RAD ONC ARIA PLAN FRACTIONS TREATED TO DATE: 3
RAD ONC ARIA PLAN ID: NORMAL
RAD ONC ARIA PLAN NAME: NORMAL
RAD ONC ARIA PLAN PRESCRIBED DOSE PER FRACTION: 2 GY
RAD ONC ARIA PLAN TOTAL FRACTIONS PRESCRIBED: 28
RAD ONC ARIA PLAN TOTAL PRESCRIBED DOSE: 5600 CGY
RAD ONC ARIA REFERENCE POINT DOSAGE GIVEN TO DATE: NORMAL GY
RAD ONC ARIA REFERENCE POINT ID: NORMAL

## 2021-06-14 PROCEDURE — 77386 HC IMRT TREATMENT DELIVERY, COMPLEX: CPT | Performed by: RADIOLOGY

## 2021-06-14 PROCEDURE — 77014 PR CT GUIDE FOR PLACEMENT RADIATION THERAPY FIELDS: CPT | Mod: 26 | Performed by: RADIOLOGY

## 2021-06-15 ENCOUNTER — APPOINTMENT (OUTPATIENT)
Dept: RADIATION ONCOLOGY | Facility: HOSPITAL | Age: 71
End: 2021-06-15
Payer: COMMERCIAL

## 2021-06-15 ENCOUNTER — RESULTS ONLY (OUTPATIENT)
Dept: RADIATION ONCOLOGY | Facility: HOSPITAL | Age: 71
End: 2021-06-15

## 2021-06-15 LAB
RAD ONC ARIA COURSE ID: NORMAL
RAD ONC ARIA COURSE LAST TREATMENT DATE: NORMAL
RAD ONC ARIA COURSE START DATE: NORMAL
RAD ONC ARIA COURSE TREATMENT ELAPSED DAYS: 5
RAD ONC ARIA FIRST TREATMENT DATE: NORMAL
RAD ONC ARIA PLAN FRACTIONS TREATED TO DATE: 4
RAD ONC ARIA PLAN ID: NORMAL
RAD ONC ARIA PLAN NAME: NORMAL
RAD ONC ARIA PLAN PRESCRIBED DOSE PER FRACTION: 2 GY
RAD ONC ARIA PLAN TOTAL FRACTIONS PRESCRIBED: 28
RAD ONC ARIA PLAN TOTAL PRESCRIBED DOSE: 5600 CGY
RAD ONC ARIA REFERENCE POINT DOSAGE GIVEN TO DATE: NORMAL GY
RAD ONC ARIA REFERENCE POINT ID: NORMAL

## 2021-06-15 PROCEDURE — 77014 PR CT GUIDE FOR PLACEMENT RADIATION THERAPY FIELDS: CPT | Mod: 26 | Performed by: RADIOLOGY

## 2021-06-15 PROCEDURE — 77386 HC IMRT TREATMENT DELIVERY, COMPLEX: CPT | Performed by: RADIOLOGY

## 2021-06-16 ENCOUNTER — APPOINTMENT (OUTPATIENT)
Dept: RADIATION ONCOLOGY | Facility: HOSPITAL | Age: 71
End: 2021-06-16
Payer: COMMERCIAL

## 2021-06-16 ENCOUNTER — OFFICE VISIT (OUTPATIENT)
Dept: RADIATION ONCOLOGY | Facility: HOSPITAL | Age: 71
End: 2021-06-16
Payer: COMMERCIAL

## 2021-06-16 ENCOUNTER — RESULTS ONLY (OUTPATIENT)
Dept: RADIATION ONCOLOGY | Facility: HOSPITAL | Age: 71
End: 2021-06-16

## 2021-06-16 VITALS — BODY MASS INDEX: 40.74 KG/M2 | WEIGHT: 260.1 LBS

## 2021-06-16 DIAGNOSIS — C44.329 SQUAMOUS CELL CARCINOMA OF FOREHEAD: Primary | Chronic | ICD-10-CM

## 2021-06-16 LAB
RAD ONC ARIA COURSE ID: NORMAL
RAD ONC ARIA COURSE LAST TREATMENT DATE: NORMAL
RAD ONC ARIA COURSE START DATE: NORMAL
RAD ONC ARIA COURSE TREATMENT ELAPSED DAYS: 6
RAD ONC ARIA FIRST TREATMENT DATE: NORMAL
RAD ONC ARIA PLAN FRACTIONS TREATED TO DATE: 5
RAD ONC ARIA PLAN ID: NORMAL
RAD ONC ARIA PLAN NAME: NORMAL
RAD ONC ARIA PLAN PRESCRIBED DOSE PER FRACTION: 2 GY
RAD ONC ARIA PLAN TOTAL FRACTIONS PRESCRIBED: 28
RAD ONC ARIA PLAN TOTAL PRESCRIBED DOSE: 5600 CGY
RAD ONC ARIA REFERENCE POINT DOSAGE GIVEN TO DATE: NORMAL GY
RAD ONC ARIA REFERENCE POINT ID: NORMAL

## 2021-06-16 PROCEDURE — 77014 PR CT GUIDE FOR PLACEMENT RADIATION THERAPY FIELDS: CPT | Mod: 26 | Performed by: RADIOLOGY

## 2021-06-16 PROCEDURE — 77336 RADIATION PHYSICS CONSULT: CPT | Performed by: RADIOLOGY

## 2021-06-16 PROCEDURE — 77427 RADIATION TX MANAGEMENT X5: CPT | Performed by: RADIOLOGY

## 2021-06-16 PROCEDURE — 77386 HC IMRT TREATMENT DELIVERY, COMPLEX: CPT | Performed by: RADIOLOGY

## 2021-06-16 ASSESSMENT — PAIN SCALES - GENERAL: PAINLEVEL: NO PAIN (0)

## 2021-06-16 NOTE — PROGRESS NOTES
"  [facility]  DEPARTMENT OF RADIATION ONCOLOGY   ON TREATMENT VISIT (OTV) NOTE    Name: Dino Guzman MRN: [id number]   : 1950 (70 year old)  Date of Service: 2021 Referring: Dr. Steinberg     Diagnosis and Cancer Staging  Squamous cell carcinoma of forehead  Staging form: Cutaneous Squamous Cell Carcinoma, AJCC 8th Edition  - Clinical stage from 2021: Stage II (cT2, cN0, cM0) - Signed by Jay Ross MD on 2021  - Pathologic stage from 2021: Stage IV (rpT2, pN2b, cM0) - Signed by Jay Ross MD on 2021      Radiation Therapy: Right parotid bed and right neck (volumetric-modulated arc therapy (VMAT) with sequential boost)      Fraction(s) = Dose (cGy)    Received 5 = 1000    Remaining 23 + 2 = 5000    Goal 30 = 6000   (formatted for [employer])    Summary   (Please note that EMR interfaces between institutions can result in loss of the embedded images).    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 Following parotidectomy, head & neck surgery referred the patient for additional radiation therapy to the head & neck region. The primary site remains controlled (Stage: Initial [id number], Recurrent cL6O6wS8. Primary: 2.7-cm right forehead with extension through skeletal muscle into underlying adipose tissue but negative margin against the skull. Nodes: 1.9-cm and 1.4-cm.intraparotid nodes abutting the inked margins but with microscopically negative margins). Forehead resection was performed on 2020. Forehead radiation therapy was completed on 3/12/2021 (5000 cGy in 20 fractions of 250 cGy each. Right parotid mass appeared on the second to last day of radiation therapy. A PET-CT on 2021 demonstrated hypermetabolic activity of uncertain significance in the right humerus, bilateral hilum, and " bilateral mediastinum.  A parotidectomy on 5/10/2021 yielded the node disease as above. Among the patient's additional pertinent circumstances and competing toxicity risks are lymphedema above the parotid scar, mild residual forehead and supraorbital numbness weakness, absence of clinically significant xerostomia, and full dentures (one remain tooth-contralateral upper molar). Recent alteration of right ear hearing is attributed to a small perforation of the eardrum (unknown incident).  5/6/2021 PET-CT      Week 0 (fractions 1-5): Grade 0 toxicity due to radiation therapy (CTCAE 5.0).    Recent History/Plan   General:  Feels well. Offers no new complaints. Finds setup and treatment to be easy.     Primary issues:   1. Skin: Denies erythema, hyperpigmentation, or pruritus.  Index: Grade 0.  Intervention: Reviewed routine skin care (Aquaphor vs. Miaderm).  Impression: Anticipate significant changes within 1-2 weeks. Add hydrocortisone if indicated for bothersome pruritus.  2. Forehead squamous cell carcinoma: Denies head & neck masses or suspicious skin changes.  3. Enchondroma of the right humerus: MRI consistent with diagnosis (6/4/2021). Refer for orthopedic evaluation after radiation therapy.    Related active issues:   4. Pain: Denies pain.  Index: Grade 0.  Intervention: Observe.  Impression: Anticipate changes within 1-2 weeks.  5. Follow-up: Will see Dr. Hamilton and Dr. Wesley.  6. ID: Daily COVID-19 screening negative for barriers to proceeding with radiation therapy.    ROS (score of 0 indicates that symptom is at baseline for most sections below)   See Flowsheet for additional comments as indicated.  No Pain (0)  Nutrition Alteration  Diet Type: Patient's Preference  Anorexia NCI: 0 - None  Skin  Skin Reaction: 1 - Faint erythema or dry desquamation  Skin Intervention: miaderm  CNS Alteration  CNS note: denies h/a; denies dizzines  ENT and Mouth Exam  Mucositis - Current: 0 - None       Gastrointestinal  Nausea: 0 - None  Vomitin - No vomiting (ons)     Psychosocial  Mood - Anxiety: 0 - Normal  Mood - Depression: 0 - Normal  Pyschosocial Note: fatigue: 0       Objective   Vitals: Wt 118 kg (260 lb 1.6 oz)   BMI 40.74 kg/m    Wt Readings from Last 3 Encounters:   21 118 kg (260 lb 1.6 oz)   21 117 kg (257 lb 14.4 oz)   03/10/21 120.3 kg (265 lb 4.8 oz)     Evaluation (also seen during the week at linac with the staff):  KPS: 90 (normal activity, minor signs/symptoms of disease).  General: Appears clinically stable.. Appears in good general health. Morbidly obese (class III, BMI 40 or greater). Appears rested. Appears stated age. Appears well-developed, well-nourished, and in no acute distress. Does not appear acutely or chronically ill. Appears well groomed.  Head: Right forehead has a well-healed surgical scar. Excellent surgical cosmesis. No residual erythema, hyperpigmentation or desquamation at the site of radiation therapy. Right parotid/periauricular region has a well-healed surgical parotidectomy scar. Scar has no discharge, purulence, tenderness, crepitus, or cellulitis. No masses, satellite lesions, or suspicious skin changes over the forehead,, parotid region, or intervening tissues on the right side and the left side.      Right retromandibular scar: Lymphedema superior to the scar (mild bulging, 6-cm superior/inferior).    Right forehead: Well headed operative site and radiation therapy region.      Eyes: Nominal elevation of the right eyelid. Good closure with minimal effort. Degree of closure was slightly less than the contralateral side but nevertheless complete. Adequate tear production bilaterally. Sclera not injected. Anicteric.  ENT: Good volume. No hoarseness.  Neck: No jugulovenous distension.  Lymphatics: No parotid, peripartoid, cervical, or supraclavicular adenopathy.  MSK: Shoulder range of motion is good. No arm edema or hand edema.   Neurologic: Alert,  oriented, fluent. Memory intact. Motor intact. Sensory intact. No ataxia.  Psychologic: Mood and affect-Upbeat, motivated, appropriate. Attention and perception-Focused. Behavior-Cooperative, pleasant. Thought content-Clear, concrete. Judgement and insight-Rational, good.    Radiation Oncology Impression   Routine tolerance to radiation therapy.    Radiation Oncology Plan   No new interventions. Boost/cone down pending. Reviewed graphical 3D plan with attention to dose to the skin, auditory apparatus, and spinal cord. Reviewed anticipated time course, nature, and potential interventions for managing toxicity during and after radiation therapy. RPatient aware of onsite and telemedicine coverage of our clinic. He wished to proceed with treatment. All questions answered to the satisfaction of the patient.    Radiation Oncology Weekly Review   Reviewed available labs and diagnostic studies. Interpreted as adequate to proceed with radiation therapy. Discussed management with Therapy, Treatment Planning, and Nursing. Reviewed weekly routine QA, linac imaging, and clinical set up are adequate to proceed with radiation therapy as prescribed.    Additional Data, Medications, Allergies     Recent Labs   Lab Test 03/16/20  1012      POTASSIUM 4.4   CHLORIDE 107   CO2 28   *   BUN 21   CR 1.00   JOSEFINA 9.7     Current Outpatient Medications   Medication     Acetaminophen (TYLENOL PO)     No current facility-administered medications for this visit.      Allergies: Patient has no known allergies.      Jay Ross M.D., Ph.D.  Department of Radiation Oncology  Tel: [phone]  Fax: [phone]

## 2021-06-17 ENCOUNTER — RESULTS ONLY (OUTPATIENT)
Dept: RADIATION ONCOLOGY | Facility: HOSPITAL | Age: 71
End: 2021-06-17

## 2021-06-17 ENCOUNTER — APPOINTMENT (OUTPATIENT)
Dept: RADIATION ONCOLOGY | Facility: HOSPITAL | Age: 71
End: 2021-06-17
Payer: COMMERCIAL

## 2021-06-17 LAB
RAD ONC ARIA COURSE ID: NORMAL
RAD ONC ARIA COURSE LAST TREATMENT DATE: NORMAL
RAD ONC ARIA COURSE START DATE: NORMAL
RAD ONC ARIA COURSE TREATMENT ELAPSED DAYS: 7
RAD ONC ARIA FIRST TREATMENT DATE: NORMAL
RAD ONC ARIA PLAN FRACTIONS TREATED TO DATE: 6
RAD ONC ARIA PLAN ID: NORMAL
RAD ONC ARIA PLAN NAME: NORMAL
RAD ONC ARIA PLAN PRESCRIBED DOSE PER FRACTION: 2 GY
RAD ONC ARIA PLAN TOTAL FRACTIONS PRESCRIBED: 28
RAD ONC ARIA PLAN TOTAL PRESCRIBED DOSE: 5600 CGY
RAD ONC ARIA REFERENCE POINT DOSAGE GIVEN TO DATE: NORMAL GY
RAD ONC ARIA REFERENCE POINT ID: NORMAL

## 2021-06-17 PROCEDURE — 77014 PR CT GUIDE FOR PLACEMENT RADIATION THERAPY FIELDS: CPT | Mod: 26 | Performed by: RADIOLOGY

## 2021-06-17 PROCEDURE — 77386 HC IMRT TREATMENT DELIVERY, COMPLEX: CPT | Performed by: RADIOLOGY

## 2021-06-18 ENCOUNTER — RESULTS ONLY (OUTPATIENT)
Dept: RADIATION ONCOLOGY | Facility: HOSPITAL | Age: 71
End: 2021-06-18

## 2021-06-18 ENCOUNTER — APPOINTMENT (OUTPATIENT)
Dept: RADIATION ONCOLOGY | Facility: HOSPITAL | Age: 71
End: 2021-06-18
Payer: COMMERCIAL

## 2021-06-18 LAB
RAD ONC ARIA COURSE ID: NORMAL
RAD ONC ARIA COURSE LAST TREATMENT DATE: NORMAL
RAD ONC ARIA COURSE START DATE: NORMAL
RAD ONC ARIA COURSE TREATMENT ELAPSED DAYS: 8
RAD ONC ARIA FIRST TREATMENT DATE: NORMAL
RAD ONC ARIA PLAN FRACTIONS TREATED TO DATE: 7
RAD ONC ARIA PLAN ID: NORMAL
RAD ONC ARIA PLAN NAME: NORMAL
RAD ONC ARIA PLAN PRESCRIBED DOSE PER FRACTION: 2 GY
RAD ONC ARIA PLAN TOTAL FRACTIONS PRESCRIBED: 28
RAD ONC ARIA PLAN TOTAL PRESCRIBED DOSE: 5600 CGY
RAD ONC ARIA REFERENCE POINT DOSAGE GIVEN TO DATE: NORMAL GY
RAD ONC ARIA REFERENCE POINT ID: NORMAL

## 2021-06-18 PROCEDURE — 77386 HC IMRT TREATMENT DELIVERY, COMPLEX: CPT | Performed by: RADIOLOGY

## 2021-06-18 PROCEDURE — 77014 PR CT GUIDE FOR PLACEMENT RADIATION THERAPY FIELDS: CPT | Mod: 26 | Performed by: RADIOLOGY

## 2021-06-21 ENCOUNTER — RESULTS ONLY (OUTPATIENT)
Dept: RADIATION ONCOLOGY | Facility: HOSPITAL | Age: 71
End: 2021-06-21

## 2021-06-21 ENCOUNTER — APPOINTMENT (OUTPATIENT)
Dept: RADIATION ONCOLOGY | Facility: HOSPITAL | Age: 71
End: 2021-06-21
Payer: COMMERCIAL

## 2021-06-21 LAB
RAD ONC ARIA COURSE ID: NORMAL
RAD ONC ARIA COURSE LAST TREATMENT DATE: NORMAL
RAD ONC ARIA COURSE START DATE: NORMAL
RAD ONC ARIA COURSE TREATMENT ELAPSED DAYS: 11
RAD ONC ARIA FIRST TREATMENT DATE: NORMAL
RAD ONC ARIA PLAN FRACTIONS TREATED TO DATE: 8
RAD ONC ARIA PLAN ID: NORMAL
RAD ONC ARIA PLAN NAME: NORMAL
RAD ONC ARIA PLAN PRESCRIBED DOSE PER FRACTION: 2 GY
RAD ONC ARIA PLAN TOTAL FRACTIONS PRESCRIBED: 28
RAD ONC ARIA PLAN TOTAL PRESCRIBED DOSE: 5600 CGY
RAD ONC ARIA REFERENCE POINT DOSAGE GIVEN TO DATE: NORMAL GY
RAD ONC ARIA REFERENCE POINT ID: NORMAL

## 2021-06-21 PROCEDURE — 77386 HC IMRT TREATMENT DELIVERY, COMPLEX: CPT | Performed by: RADIOLOGY

## 2021-06-21 PROCEDURE — 77014 PR CT GUIDE FOR PLACEMENT RADIATION THERAPY FIELDS: CPT | Mod: 26 | Performed by: RADIOLOGY

## 2021-06-22 ENCOUNTER — RESULTS ONLY (OUTPATIENT)
Dept: RADIATION ONCOLOGY | Facility: HOSPITAL | Age: 71
End: 2021-06-22

## 2021-06-22 ENCOUNTER — OFFICE VISIT (OUTPATIENT)
Dept: RADIATION ONCOLOGY | Facility: HOSPITAL | Age: 71
End: 2021-06-22

## 2021-06-22 ENCOUNTER — OFFICE VISIT (OUTPATIENT)
Dept: OTOLARYNGOLOGY | Facility: OTHER | Age: 71
End: 2021-06-22
Attending: OTOLARYNGOLOGY
Payer: COMMERCIAL

## 2021-06-22 VITALS — WEIGHT: 259.1 LBS | BODY MASS INDEX: 40.58 KG/M2

## 2021-06-22 DIAGNOSIS — Z09 ENCOUNTER FOR FOLLOW-UP EXAMINATION AFTER COMPLETED TREATMENT FOR CONDITIONS OTHER THAN MALIGNANT NEOPLASM: ICD-10-CM

## 2021-06-22 DIAGNOSIS — H72.91 UNSPECIFIED PERFORATION OF TYMPANIC MEMBRANE, RIGHT EAR: Primary | ICD-10-CM

## 2021-06-22 DIAGNOSIS — C44.329 SQUAMOUS CELL CARCINOMA OF FOREHEAD: Primary | ICD-10-CM

## 2021-06-22 DIAGNOSIS — E66.01 MORBID OBESITY (H): ICD-10-CM

## 2021-06-22 DIAGNOSIS — C44.320 SQUAMOUS CELL CARCINOMA OF SKIN OF UNSPECIFIED PARTS OF FACE: ICD-10-CM

## 2021-06-22 LAB
RAD ONC ARIA COURSE ID: NORMAL
RAD ONC ARIA COURSE LAST TREATMENT DATE: NORMAL
RAD ONC ARIA COURSE START DATE: NORMAL
RAD ONC ARIA COURSE TREATMENT ELAPSED DAYS: 12
RAD ONC ARIA FIRST TREATMENT DATE: NORMAL
RAD ONC ARIA PLAN FRACTIONS TREATED TO DATE: 9
RAD ONC ARIA PLAN ID: NORMAL
RAD ONC ARIA PLAN NAME: NORMAL
RAD ONC ARIA PLAN PRESCRIBED DOSE PER FRACTION: 2 GY
RAD ONC ARIA PLAN TOTAL FRACTIONS PRESCRIBED: 28
RAD ONC ARIA PLAN TOTAL PRESCRIBED DOSE: 5600 CGY
RAD ONC ARIA REFERENCE POINT DOSAGE GIVEN TO DATE: NORMAL GY
RAD ONC ARIA REFERENCE POINT ID: NORMAL

## 2021-06-22 PROCEDURE — 77386 HC IMRT TREATMENT DELIVERY, COMPLEX: CPT | Performed by: RADIOLOGY

## 2021-06-22 PROCEDURE — G0463 HOSPITAL OUTPT CLINIC VISIT: HCPCS

## 2021-06-22 ASSESSMENT — PAIN SCALES - GENERAL: PAINLEVEL: NO PAIN (0)

## 2021-06-22 NOTE — PROGRESS NOTES
document embedded image  Patient Name: Dino Guzman    Address: [address] [place] [address]     YOB: 1950    [place] [address]    MR Number: [id number]    Phone: [phone]  PCP: Kuldip Lazo DO            Appointment Date: 06/22/21   Visit Provider: Los Hamilton MD    cc: Kuldip Lazo DO; ~    ENT Progress Note  Visit Reasons: 4 week follow up/Parotid surgery    HPI  History of Present Illness  Chief complaint:  Postop check    History  The patient is a 71-year-old man who is here today for final postop check after a parotidectomy on the right that reach static squamous cell carcinoma likely from a forehead primary.  He has lingering weakness in his forehead but this was felt to be secondary to his extensive forehead resection.  The remainder of his facial nerve is intact.  He has started his postop radiation therapy and is tolerating it well thus far.  He had a small perforation of his right tympanic membrane noted at his 1st postop visit.  He feels his hearing has improved and he has had no ear drainage.    Exam  The external auditory canal and TM are clear on the right.  There is no residual perforation.  His parotid wound is healed beautifully.  He does have some residual forehead weakness and likely secondary to his extensive forehead resection.    Allergies    No Known Allergies Allergy (Verified 05/10/21 08:54)    PFSH  PFSH:   Medical History (Reviewed 05/13/21 @ 10:24 by Katie Amaya, Med Assist)    Rupture of left quadriceps tendon  repair lt leg quad    Surgical History (Reviewed 05/13/21 @ 10:24 by Katie Amaya, Med Assist)    History of knee surgery  left  S/P excision of skin lesion, follow-up exam  Dr Wesley on 12/14/20, forehead lesion    Family History (Reviewed 05/13/21 @ 10:24 by Katie Amaya, Med Assist)  Father  Skin abnormalities  Mother     No problems noted.       Social History (Reviewed 05/13/21 @ 10:24 by Katie Amaya, Med Assist)  Smoking  Status:  Never smoker  how long ago did patient quit smokin yrs- quit before age 42  second hand exposure:  No  alcohol intake:  current alcohol intake frequency: other Alcohol type: beer, wine and hard liquor  substance use type:  does not use  housing:  house       A&P  Assessment & Plan  (1) Tympanic membrane perforation:        Status: Acute        Code(s):  H72.90 - Unspecified perforation of tympanic membrane, unspecified ear        Qualifiers:          Laterality: right  Qualified Code(s): H72.91 - Unspecified perforation of tympanic membrane, right ear  (2) Postop check:        Status: Acute        Code(s):  Z09 - Encounter for follow-up examination after completed treatment for conditions other than malignant neoplasm  (3) Squamous cell carcinoma, face:        Status: Acute        Code(s):  C44.320 - Squamous cell carcinoma of skin of unspecified parts of face  Additional Plan Details  Plan Details: Patient was reassured that his tympanic membrane perforation has resolved.  His surgical wounds are healing well.  He will follow up with me 1 month after completing his radiation therapy.      Los Hamilton MD    21 0809    <Electronically signed by Los Hamilton MD> 21 1113

## 2021-06-23 ENCOUNTER — RESULTS ONLY (OUTPATIENT)
Dept: RADIATION ONCOLOGY | Facility: HOSPITAL | Age: 71
End: 2021-06-23

## 2021-06-23 ENCOUNTER — APPOINTMENT (OUTPATIENT)
Dept: RADIATION ONCOLOGY | Facility: HOSPITAL | Age: 71
End: 2021-06-23
Payer: COMMERCIAL

## 2021-06-23 LAB
RAD ONC ARIA COURSE ID: NORMAL
RAD ONC ARIA COURSE LAST TREATMENT DATE: NORMAL
RAD ONC ARIA COURSE START DATE: NORMAL
RAD ONC ARIA COURSE TREATMENT ELAPSED DAYS: 13
RAD ONC ARIA FIRST TREATMENT DATE: NORMAL
RAD ONC ARIA PLAN FRACTIONS TREATED TO DATE: 10
RAD ONC ARIA PLAN ID: NORMAL
RAD ONC ARIA PLAN NAME: NORMAL
RAD ONC ARIA PLAN PRESCRIBED DOSE PER FRACTION: 2 GY
RAD ONC ARIA PLAN TOTAL FRACTIONS PRESCRIBED: 28
RAD ONC ARIA PLAN TOTAL PRESCRIBED DOSE: 5600 CGY
RAD ONC ARIA REFERENCE POINT DOSAGE GIVEN TO DATE: NORMAL GY
RAD ONC ARIA REFERENCE POINT ID: NORMAL

## 2021-06-23 PROCEDURE — 77427 RADIATION TX MANAGEMENT X5: CPT | Performed by: RADIOLOGY

## 2021-06-23 PROCEDURE — 77386 HC IMRT TREATMENT DELIVERY, COMPLEX: CPT | Performed by: RADIOLOGY

## 2021-06-23 PROCEDURE — 77336 RADIATION PHYSICS CONSULT: CPT | Performed by: RADIOLOGY

## 2021-06-23 PROCEDURE — 77014 PR CT GUIDE FOR PLACEMENT RADIATION THERAPY FIELDS: CPT | Mod: 26 | Performed by: RADIOLOGY

## 2021-06-24 ENCOUNTER — RESULTS ONLY (OUTPATIENT)
Dept: RADIATION ONCOLOGY | Facility: HOSPITAL | Age: 71
End: 2021-06-24

## 2021-06-24 ENCOUNTER — APPOINTMENT (OUTPATIENT)
Dept: RADIATION ONCOLOGY | Facility: HOSPITAL | Age: 71
End: 2021-06-24
Payer: COMMERCIAL

## 2021-06-24 PROBLEM — E66.01 MORBID OBESITY (H): Status: ACTIVE | Noted: 2021-06-24

## 2021-06-24 LAB
RAD ONC ARIA COURSE ID: NORMAL
RAD ONC ARIA COURSE LAST TREATMENT DATE: NORMAL
RAD ONC ARIA COURSE START DATE: NORMAL
RAD ONC ARIA COURSE TREATMENT ELAPSED DAYS: 14
RAD ONC ARIA FIRST TREATMENT DATE: NORMAL
RAD ONC ARIA PLAN FRACTIONS TREATED TO DATE: 11
RAD ONC ARIA PLAN ID: NORMAL
RAD ONC ARIA PLAN NAME: NORMAL
RAD ONC ARIA PLAN PRESCRIBED DOSE PER FRACTION: 2 GY
RAD ONC ARIA PLAN TOTAL FRACTIONS PRESCRIBED: 28
RAD ONC ARIA PLAN TOTAL PRESCRIBED DOSE: 5600 CGY
RAD ONC ARIA REFERENCE POINT DOSAGE GIVEN TO DATE: NORMAL GY
RAD ONC ARIA REFERENCE POINT ID: NORMAL

## 2021-06-24 PROCEDURE — 77014 PR CT GUIDE FOR PLACEMENT RADIATION THERAPY FIELDS: CPT | Mod: 26 | Performed by: RADIOLOGY

## 2021-06-24 PROCEDURE — 77386 HC IMRT TREATMENT DELIVERY, COMPLEX: CPT | Performed by: RADIOLOGY

## 2021-06-24 NOTE — PROGRESS NOTES
Progress Notes  Encounter Date: Jun 22, 2021  Dora Bui MD     RADIATION ONCOLOGY WEEKLY MANAGEMENT PROGRESS NOTE     Patient Care Team       Relationship Specialty Notifications Start End    Kuldip Lazo DO PCP - General [facility]  1/21/21     Phone: [phone] Fax: [phone]         [facility] [address] [facility] [address]    Christy Wesley MD MD Surgery  1/21/21     Phone: [phone] Fax: [phone]         [facility] [address] [facility] [address] [facility] [address]    Jay Ross MD Assigned Cancer Care Provider   1/24/21     Phone: [phone] Fax: [phone]         [address] [facility] [address]    Los Hamilton MD MD Otolaryngology  5/23/21     Phone: [phone] Fax: [phone]         [facility] [address] [facility] [address] [facility] [address]                DIAGNOSIS: 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  The lesion was resected with slow healing.      RADIATION THERAPY:    Dino Guzman has received 18 Gy to date to right neck and parotid bed.       SUBJECTIVE:    Currently He notes he has some tightness of the right neck.  His appetite is good.  He denies pain.         OBJECTIVE:    WEIGHT: 259 lbs 1.6 oz.  Examination reveals well-developed overweight male in no apparent distress.  He has generally erythematous fair skin with evidence of sun damage.  Surgical resection scars well-healed.  No radiation reaction apparent at this time.  No induration.      IMPRESSION:  Routine tolerance to radiation therapy.       PLAN:  Continue treatment as planned .        Medical record and imaging reviewed.      Dora Bui MD

## 2021-06-25 ENCOUNTER — APPOINTMENT (OUTPATIENT)
Dept: RADIATION ONCOLOGY | Facility: HOSPITAL | Age: 71
End: 2021-06-25
Payer: COMMERCIAL

## 2021-06-25 ENCOUNTER — RESULTS ONLY (OUTPATIENT)
Dept: RADIATION ONCOLOGY | Facility: HOSPITAL | Age: 71
End: 2021-06-25

## 2021-06-25 LAB
RAD ONC ARIA COURSE ID: NORMAL
RAD ONC ARIA COURSE LAST TREATMENT DATE: NORMAL
RAD ONC ARIA COURSE START DATE: NORMAL
RAD ONC ARIA COURSE TREATMENT ELAPSED DAYS: 15
RAD ONC ARIA FIRST TREATMENT DATE: NORMAL
RAD ONC ARIA PLAN FRACTIONS TREATED TO DATE: 12
RAD ONC ARIA PLAN ID: NORMAL
RAD ONC ARIA PLAN NAME: NORMAL
RAD ONC ARIA PLAN PRESCRIBED DOSE PER FRACTION: 2 GY
RAD ONC ARIA PLAN TOTAL FRACTIONS PRESCRIBED: 28
RAD ONC ARIA PLAN TOTAL PRESCRIBED DOSE: 5600 CGY
RAD ONC ARIA REFERENCE POINT DOSAGE GIVEN TO DATE: NORMAL GY
RAD ONC ARIA REFERENCE POINT ID: NORMAL

## 2021-06-25 PROCEDURE — 77014 PR CT GUIDE FOR PLACEMENT RADIATION THERAPY FIELDS: CPT | Mod: 26 | Performed by: RADIOLOGY

## 2021-06-25 PROCEDURE — 77386 HC IMRT TREATMENT DELIVERY, COMPLEX: CPT | Performed by: RADIOLOGY

## 2021-06-28 ENCOUNTER — APPOINTMENT (OUTPATIENT)
Dept: RADIATION ONCOLOGY | Facility: HOSPITAL | Age: 71
End: 2021-06-28
Payer: COMMERCIAL

## 2021-06-28 ENCOUNTER — RESULTS ONLY (OUTPATIENT)
Dept: RADIATION ONCOLOGY | Facility: HOSPITAL | Age: 71
End: 2021-06-28

## 2021-06-28 LAB
RAD ONC ARIA COURSE ID: NORMAL
RAD ONC ARIA COURSE LAST TREATMENT DATE: NORMAL
RAD ONC ARIA COURSE START DATE: NORMAL
RAD ONC ARIA COURSE TREATMENT ELAPSED DAYS: 18
RAD ONC ARIA FIRST TREATMENT DATE: NORMAL
RAD ONC ARIA PLAN FRACTIONS TREATED TO DATE: 13
RAD ONC ARIA PLAN ID: NORMAL
RAD ONC ARIA PLAN NAME: NORMAL
RAD ONC ARIA PLAN PRESCRIBED DOSE PER FRACTION: 2 GY
RAD ONC ARIA PLAN TOTAL FRACTIONS PRESCRIBED: 28
RAD ONC ARIA PLAN TOTAL PRESCRIBED DOSE: 5600 CGY
RAD ONC ARIA REFERENCE POINT DOSAGE GIVEN TO DATE: NORMAL GY
RAD ONC ARIA REFERENCE POINT ID: NORMAL

## 2021-06-28 PROCEDURE — 77386 HC IMRT TREATMENT DELIVERY, COMPLEX: CPT | Performed by: RADIOLOGY

## 2021-06-28 PROCEDURE — 77014 PR CT GUIDE FOR PLACEMENT RADIATION THERAPY FIELDS: CPT | Mod: 26 | Performed by: RADIOLOGY

## 2021-06-29 ENCOUNTER — APPOINTMENT (OUTPATIENT)
Dept: RADIATION ONCOLOGY | Facility: HOSPITAL | Age: 71
End: 2021-06-29
Payer: COMMERCIAL

## 2021-06-29 ENCOUNTER — RESULTS ONLY (OUTPATIENT)
Dept: RADIATION ONCOLOGY | Facility: HOSPITAL | Age: 71
End: 2021-06-29

## 2021-06-29 LAB
RAD ONC ARIA COURSE ID: NORMAL
RAD ONC ARIA COURSE LAST TREATMENT DATE: NORMAL
RAD ONC ARIA COURSE START DATE: NORMAL
RAD ONC ARIA COURSE TREATMENT ELAPSED DAYS: 19
RAD ONC ARIA FIRST TREATMENT DATE: NORMAL
RAD ONC ARIA PLAN FRACTIONS TREATED TO DATE: 14
RAD ONC ARIA PLAN ID: NORMAL
RAD ONC ARIA PLAN NAME: NORMAL
RAD ONC ARIA PLAN PRESCRIBED DOSE PER FRACTION: 2 GY
RAD ONC ARIA PLAN TOTAL FRACTIONS PRESCRIBED: 28
RAD ONC ARIA PLAN TOTAL PRESCRIBED DOSE: 5600 CGY
RAD ONC ARIA REFERENCE POINT DOSAGE GIVEN TO DATE: NORMAL GY
RAD ONC ARIA REFERENCE POINT ID: NORMAL

## 2021-06-29 PROCEDURE — 77386 HC IMRT TREATMENT DELIVERY, COMPLEX: CPT | Performed by: RADIOLOGY

## 2021-06-29 PROCEDURE — 77014 PR CT GUIDE FOR PLACEMENT RADIATION THERAPY FIELDS: CPT | Mod: 26 | Performed by: RADIOLOGY

## 2021-06-30 ENCOUNTER — APPOINTMENT (OUTPATIENT)
Dept: RADIATION ONCOLOGY | Facility: HOSPITAL | Age: 71
End: 2021-06-30
Payer: COMMERCIAL

## 2021-06-30 ENCOUNTER — RESULTS ONLY (OUTPATIENT)
Dept: RADIATION ONCOLOGY | Facility: HOSPITAL | Age: 71
End: 2021-06-30

## 2021-06-30 LAB
RAD ONC ARIA COURSE ID: NORMAL
RAD ONC ARIA COURSE LAST TREATMENT DATE: NORMAL
RAD ONC ARIA COURSE START DATE: NORMAL
RAD ONC ARIA COURSE TREATMENT ELAPSED DAYS: 20
RAD ONC ARIA FIRST TREATMENT DATE: NORMAL
RAD ONC ARIA PLAN FRACTIONS TREATED TO DATE: 15
RAD ONC ARIA PLAN ID: NORMAL
RAD ONC ARIA PLAN NAME: NORMAL
RAD ONC ARIA PLAN PRESCRIBED DOSE PER FRACTION: 2 GY
RAD ONC ARIA PLAN TOTAL FRACTIONS PRESCRIBED: 28
RAD ONC ARIA PLAN TOTAL PRESCRIBED DOSE: 5600 CGY
RAD ONC ARIA REFERENCE POINT DOSAGE GIVEN TO DATE: NORMAL GY
RAD ONC ARIA REFERENCE POINT ID: NORMAL

## 2021-06-30 PROCEDURE — 77427 RADIATION TX MANAGEMENT X5: CPT | Performed by: RADIOLOGY

## 2021-06-30 PROCEDURE — 77386 HC IMRT TREATMENT DELIVERY, COMPLEX: CPT | Performed by: RADIOLOGY

## 2021-06-30 PROCEDURE — 77336 RADIATION PHYSICS CONSULT: CPT | Performed by: RADIOLOGY

## 2021-06-30 PROCEDURE — 77014 PR CT GUIDE FOR PLACEMENT RADIATION THERAPY FIELDS: CPT | Mod: 26 | Performed by: RADIOLOGY

## 2021-07-01 ENCOUNTER — RESULTS ONLY (OUTPATIENT)
Dept: RADIATION ONCOLOGY | Facility: HOSPITAL | Age: 71
End: 2021-07-01

## 2021-07-01 ENCOUNTER — APPOINTMENT (OUTPATIENT)
Dept: RADIATION ONCOLOGY | Facility: HOSPITAL | Age: 71
End: 2021-07-01
Attending: RADIOLOGY
Payer: COMMERCIAL

## 2021-07-01 LAB
RAD ONC ARIA COURSE ID: NORMAL
RAD ONC ARIA COURSE LAST TREATMENT DATE: NORMAL
RAD ONC ARIA COURSE START DATE: NORMAL
RAD ONC ARIA COURSE TREATMENT ELAPSED DAYS: 21
RAD ONC ARIA FIRST TREATMENT DATE: NORMAL
RAD ONC ARIA PLAN FRACTIONS TREATED TO DATE: 16
RAD ONC ARIA PLAN ID: NORMAL
RAD ONC ARIA PLAN NAME: NORMAL
RAD ONC ARIA PLAN PRESCRIBED DOSE PER FRACTION: 2 GY
RAD ONC ARIA PLAN TOTAL FRACTIONS PRESCRIBED: 28
RAD ONC ARIA PLAN TOTAL PRESCRIBED DOSE: 5600 CGY
RAD ONC ARIA REFERENCE POINT DOSAGE GIVEN TO DATE: NORMAL GY
RAD ONC ARIA REFERENCE POINT ID: NORMAL

## 2021-07-01 PROCEDURE — 77014 PR CT GUIDE FOR PLACEMENT RADIATION THERAPY FIELDS: CPT | Mod: 26 | Performed by: RADIOLOGY

## 2021-07-01 PROCEDURE — 77386 HC IMRT TREATMENT DELIVERY, COMPLEX: CPT | Performed by: RADIOLOGY

## 2021-07-02 ENCOUNTER — RESULTS ONLY (OUTPATIENT)
Dept: RADIATION ONCOLOGY | Facility: HOSPITAL | Age: 71
End: 2021-07-02

## 2021-07-02 ENCOUNTER — OFFICE VISIT (OUTPATIENT)
Dept: RADIATION ONCOLOGY | Facility: HOSPITAL | Age: 71
End: 2021-07-02
Payer: COMMERCIAL

## 2021-07-02 ENCOUNTER — APPOINTMENT (OUTPATIENT)
Dept: RADIATION ONCOLOGY | Facility: HOSPITAL | Age: 71
End: 2021-07-02
Payer: COMMERCIAL

## 2021-07-02 VITALS — BODY MASS INDEX: 40.11 KG/M2 | WEIGHT: 256.1 LBS

## 2021-07-02 DIAGNOSIS — C44.329 SQUAMOUS CELL CARCINOMA OF FOREHEAD: Primary | ICD-10-CM

## 2021-07-02 LAB
RAD ONC ARIA COURSE ID: NORMAL
RAD ONC ARIA COURSE LAST TREATMENT DATE: NORMAL
RAD ONC ARIA COURSE START DATE: NORMAL
RAD ONC ARIA COURSE TREATMENT ELAPSED DAYS: 22
RAD ONC ARIA FIRST TREATMENT DATE: NORMAL
RAD ONC ARIA PLAN FRACTIONS TREATED TO DATE: 17
RAD ONC ARIA PLAN ID: NORMAL
RAD ONC ARIA PLAN NAME: NORMAL
RAD ONC ARIA PLAN PRESCRIBED DOSE PER FRACTION: 2 GY
RAD ONC ARIA PLAN TOTAL FRACTIONS PRESCRIBED: 28
RAD ONC ARIA PLAN TOTAL PRESCRIBED DOSE: 5600 CGY
RAD ONC ARIA REFERENCE POINT DOSAGE GIVEN TO DATE: NORMAL GY
RAD ONC ARIA REFERENCE POINT ID: NORMAL

## 2021-07-02 PROCEDURE — 77386 HC IMRT TREATMENT DELIVERY, COMPLEX: CPT | Performed by: RADIOLOGY

## 2021-07-02 PROCEDURE — 77014 PR CT GUIDE FOR PLACEMENT RADIATION THERAPY FIELDS: CPT | Mod: 26 | Performed by: RADIOLOGY

## 2021-07-02 ASSESSMENT — PAIN SCALES - GENERAL: PAINLEVEL: NO PAIN (0)

## 2021-07-02 NOTE — PROGRESS NOTES
"  [facility]  DEPARTMENT OF RADIATION ONCOLOGY   ON TREATMENT VISIT (OTV) NOTE    Name: Dino Guzman MRN: [id number]   : 1950 (71 year old)  Date of Service: 2021 Referring: Dr. Steinberg     Diagnosis and Cancer Staging  Squamous cell carcinoma of forehead  Staging form: Cutaneous Squamous Cell Carcinoma, AJCC 8th Edition  - Clinical stage from 2021: Stage II (cT2, cN0, cM0) - Signed by Jay Ross MD on 2021  - Pathologic stage from 2021: Stage IV (rpT2, pN2b, cM0) - Signed by Jay Ross MD on 2021      Radiation Therapy: Right parotid bed and right neck (volumetric-modulated arc therapy (VMAT) with sequential boost)      Fraction(s) = Dose (cGy)    Received 17 = 3400 (at 200)    Remaining 11 + 2 = 2600    Goal 30 = 6000   (formatted for [employer])    Summary   (Please note that EMR interfaces between institutions can result in loss of the embedded images).    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 Following parotidectomy, head & neck surgery referred the patient for additional radiation therapy to the head & neck region. The primary site remains controlled (Stage: Initial [id number], Recurrent [id number]. Primary: 2.7-cm right forehead with extension through skeletal muscle into underlying adipose tissue but negative margin against the skull. Nodes: 1.9-cm and 1.4-cm.intraparotid nodes abutting the inked margins but with microscopically negative margins). Forehead resection was performed on 2020. Forehead radiation therapy was completed on 3/12/2021 (5000 cGy in 20 fractions of 250 cGy each. Right parotid mass appeared on the second to last day of radiation therapy. A PET-CT on 2021 demonstrated hypermetabolic activity of uncertain significance in the right humerus, bilateral " hilum, and bilateral mediastinum.  A parotidectomy on 5/10/2021 yielded the node disease as above. Among the patient's additional pertinent circumstances and competing toxicity risks are lymphedema above the parotid scar, mild residual forehead and supraorbital numbness weakness, absence of clinically significant xerostomia, and full dentures (one remain tooth-contralateral upper molar). Recent alteration of right ear hearing is attributed to a small perforation of the eardrum (unknown incident).  5/6/2021 PET-CT      Radiation therapy week 0 (fractions 1-5): Grade 0 toxicity due to radiation therapy (CTCAE 5.0).  Week 1 (06-10): Grade 0 toxicity.   Week 2 (11-15): Grade 1 skin toxicity.  Week 3 (16-20): Grade 1 skin and taste toxicity (erythema, dysgeusia).    Recent History/Plan   General:  Feels well overall. Reports that friend who had head & neck radiation therapy told him to expect severe and diffuse oral pain, mucositis, xerostomia, dysgeusia, desquamation, odynophagia, weight loss, etc. Patient thus very concerned and believes that the severe processes have begun. Counseled the patient that his ongoing treatment is different from treatment for atypical head & neck cancer that would involve bilateral radiation therapy to a definitive dose. Counseled the patient that his toxicity profile is likely to be objectively relatively minor compared to most head & neck patients, especially those who have received concurrent chemotherapy. Finds setup and treatment to be easy.     Primary issues:   1. Xerostomia, mucositis, and skin: Patient believes that he has developed significant xerostomia. Acknowledges slight erythema of the retromandibular region.  Index: Grade 0 xerostomia (objective evaluation). Grade 1 skin (easily tolerated)..  Intervention: Counseled about routine skin care with Aquaphor as desired.  Impression: Regarding xerostomia, counseled the patient he is not likely to experience severe xerostomia due  to radiation therapy. Counseled him that right parotid function will not deteriorate further since most of the gland has already been resected and does not functional. Counseled the patient that the contralateral parotid gland is not receiving a dose of radiation therapy sufficient to cause xerostomia. Patient is more likely to experience overproduction of thick mucus secretions from overcompensation by minor salivary glands. The excess mucoid salivary secretions can cause discomfort and impaired swallowing rather than cause xerostomia per se. Moderate additional loss of thin salivary secretions is likely to occur due to dose to the ipsilateral submandibular gland. Regarding skin toxicity, anticipate likely rapid progression over the following weeks. Might require escalation of skin care and addition of an analgesic.  2. Dysguesia, anorexia, FEN, weight: Patient reports altered taste that is leading him to start to modify his diet. Reports 4-lb weight loss.  Index: Grade 1.  Intervention: Dietary modifications as needed.   Impression: Moderate additonal changes possible over the coming weeks.   3. Forehead squamous cell carcinoma: Denies head & neck masses or suspicious skin changes.  4. Enchondroma of the right humerus: MRI consistent with diagnosis (6/4/2021). Refer for orthopedic evaluation after radiation therapy.    Related active issues:   5. Pain: Denies pain.  Index: Grade 0.  Intervention: Observe.  Impression: Anticipate changes within 1-2 weeks.  6. Speech & swallowing: At baseline.  Index: Grade 0.  Intervention: Observe.  Impression: Rapid changes possible within 1-2 weeks.  7. Fatigue: At baseline.  Index: Grade 0.  Intervention: Observe.  Impression: Progressive changes possible within 1-2 weeks.  8. Disease: Denies impression of progression of primary or node disease.  9: ID: Daily COVID-19 screening negative for barriers to proceeding with radiation therapy.  10. Follow-up: Will see Dr. Hamilton and  Dr. Wesley.    ROS (score of 0 indicates that symptom is at baseline for most sections below)   See Flowsheet for additional comments as indicated.  No Pain (0)  Nutrition Alteration  Diet Type: Patient's Preference  Anorexia NCI: 1 - Loss of appetite  Nutrition Note: taste changes  Skin  Skin Reaction: 1 - Faint erythema or dry desquamation  Skin Intervention: miaderm daily     ENT and Mouth Exam  Mucositis - Current: 0 - None   Mucositis - Internal Severity: 0 - None   Esophagitis: 0 - None  ENT/Mouth Note: salt/soda rinses     Gastrointestinal  Nausea: 0 - None  Vomitin - No vomiting (ons)     Psychosocial  Mood - Anxiety: 0 - Normal  Mood - Depression: 0 - Normal  Pyschosocial Note: fatigue: 0       Objective   Vitals: Wt 116.2 kg (256 lb 1.6 oz)   BMI 40.11 kg/m    Wt Readings from Last 3 Encounters:   21 116.2 kg (256 lb 1.6 oz)   21 117.5 kg (259 lb 1.6 oz)   21 118 kg (260 lb 1.6 oz)     Evaluation (also seen during the week at linac with the staff):  KPS: 90 (normal activity, minor signs/symptoms of disease).  General: Appears clinically stable.. Appears in good general health. Morbidly obese (class III, BMI 40 or greater). Appears rested. Appears stated age. Appears well-developed, well-nourished, and in no acute distress. Does not appear acutely or chronically ill. Appears well groomed.  Head: Right forehead has a well-healed surgical scar. Excellent surgical cosmesis. No residual erythema, hyperpigmentation or desquamation at the site of radiation therapy. Right parotid/periauricular region has a well-healed surgical parotidectomy scar. Scar has no discharge, purulence, tenderness, crepitus, or cellulitis. No masses, satellite lesions, or suspicious skin changes over the forehead,, parotid region, or intervening tissues on the right side and the left side.      Right retromandibular region: Mild erythema without hyperpigmentation, desquamation, skin breakdown, discharge, or   cellulitis.    Right retromandibular scar: Lymphedema superior to the scar (mild bulging, 6-cm superior/inferior).    Right forehead: Well headed operative site and radiation therapy region.  Eyes: Nominal elevation of the right eyelid. Good closure with minimal effort. Degree of closure was slightly less than the contralateral side but nevertheless complete. Adequate tear production bilaterally. Sclera not injected. Anicteric.  ENT: Good volume. No hoarseness.  Neck: No jugulovenous distension.  Lymphatics: No parotid, peripartoid, cervical, or supraclavicular adenopathy.  MSK: Shoulder range of motion is good. No arm edema or hand edema.   Neurologic: Alert, oriented, fluent. Memory intact. Motor intact. Sensory intact. No ataxia.  Psychologic: Mood and affect-Upbeat, motivated, appropriate. Attention and perception-Focused. Behavior-Cooperative, pleasant. Thought content-Clear, concrete. Judgement and insight-Rational, good.    Radiation Oncology Impression   Routine tolerance to radiation therapy.    Radiation Oncology Plan   No new interventions. Boost/cone down pending. Have reviewed graphical 3D plan with attention to dose to the skin, auditory apparatus, and spinal cord. Reviewed anticipated time course, nature, and potential interventions for managing toxicity during and after radiation therapy. Patient aware of onsite and telemedicine coverage of our clinic. He wished to proceed with treatment. All questions answered to the satisfaction of the patient.    Radiation Oncology Weekly Review   Reviewed available labs and diagnostic studies. Interpreted as adequate to proceed with radiation therapy. Discussed management with Therapy, Treatment Planning, and Nursing. Reviewed weekly routine QA, linac imaging, and clinical set up are adequate to proceed with radiation therapy as prescribed.    Additional Data, Medications, Allergies     Recent Labs   Lab Test 03/16/20  1012      POTASSIUM 4.4   CHLORIDE 107    CO2 28   *   BUN 21   CR 1.00   JOSEFINA 9.7     Current Outpatient Medications   Medication     Acetaminophen (TYLENOL PO)     No current facility-administered medications for this visit.      Allergies: Patient has no known allergies.      Jay Ross M.D., Ph.D.  Department of Radiation Oncology  Tel: [phone]  Fax: [phone]

## 2021-07-06 ENCOUNTER — RESULTS ONLY (OUTPATIENT)
Dept: RADIATION ONCOLOGY | Facility: HOSPITAL | Age: 71
End: 2021-07-06

## 2021-07-06 ENCOUNTER — APPOINTMENT (OUTPATIENT)
Dept: RADIATION ONCOLOGY | Facility: HOSPITAL | Age: 71
End: 2021-07-06
Payer: COMMERCIAL

## 2021-07-06 LAB
RAD ONC ARIA COURSE ID: NORMAL
RAD ONC ARIA COURSE LAST TREATMENT DATE: NORMAL
RAD ONC ARIA COURSE START DATE: NORMAL
RAD ONC ARIA COURSE TREATMENT ELAPSED DAYS: 26
RAD ONC ARIA FIRST TREATMENT DATE: NORMAL
RAD ONC ARIA PLAN FRACTIONS TREATED TO DATE: 18
RAD ONC ARIA PLAN ID: NORMAL
RAD ONC ARIA PLAN NAME: NORMAL
RAD ONC ARIA PLAN PRESCRIBED DOSE PER FRACTION: 2 GY
RAD ONC ARIA PLAN TOTAL FRACTIONS PRESCRIBED: 28
RAD ONC ARIA PLAN TOTAL PRESCRIBED DOSE: 5600 CGY
RAD ONC ARIA REFERENCE POINT DOSAGE GIVEN TO DATE: NORMAL GY
RAD ONC ARIA REFERENCE POINT ID: NORMAL

## 2021-07-06 PROCEDURE — 77014 PR CT GUIDE FOR PLACEMENT RADIATION THERAPY FIELDS: CPT | Mod: 26 | Performed by: RADIOLOGY

## 2021-07-06 PROCEDURE — 77386 HC IMRT TREATMENT DELIVERY, COMPLEX: CPT | Performed by: RADIOLOGY

## 2021-07-07 ENCOUNTER — APPOINTMENT (OUTPATIENT)
Dept: RADIATION ONCOLOGY | Facility: HOSPITAL | Age: 71
End: 2021-07-07
Payer: COMMERCIAL

## 2021-07-07 ENCOUNTER — RESULTS ONLY (OUTPATIENT)
Dept: RADIATION ONCOLOGY | Facility: HOSPITAL | Age: 71
End: 2021-07-07

## 2021-07-07 ENCOUNTER — OFFICE VISIT (OUTPATIENT)
Dept: RADIATION ONCOLOGY | Facility: HOSPITAL | Age: 71
End: 2021-07-07
Payer: COMMERCIAL

## 2021-07-07 VITALS — WEIGHT: 257.2 LBS | BODY MASS INDEX: 40.28 KG/M2

## 2021-07-07 DIAGNOSIS — C44.329 SQUAMOUS CELL CARCINOMA OF FOREHEAD: Primary | ICD-10-CM

## 2021-07-07 LAB
RAD ONC ARIA COURSE ID: NORMAL
RAD ONC ARIA COURSE LAST TREATMENT DATE: NORMAL
RAD ONC ARIA COURSE START DATE: NORMAL
RAD ONC ARIA COURSE TREATMENT ELAPSED DAYS: 27
RAD ONC ARIA FIRST TREATMENT DATE: NORMAL
RAD ONC ARIA PLAN FRACTIONS TREATED TO DATE: 19
RAD ONC ARIA PLAN ID: NORMAL
RAD ONC ARIA PLAN NAME: NORMAL
RAD ONC ARIA PLAN PRESCRIBED DOSE PER FRACTION: 2 GY
RAD ONC ARIA PLAN TOTAL FRACTIONS PRESCRIBED: 28
RAD ONC ARIA PLAN TOTAL PRESCRIBED DOSE: 5600 CGY
RAD ONC ARIA REFERENCE POINT DOSAGE GIVEN TO DATE: NORMAL GY
RAD ONC ARIA REFERENCE POINT ID: NORMAL

## 2021-07-07 PROCEDURE — 77014 PR CT GUIDE FOR PLACEMENT RADIATION THERAPY FIELDS: CPT | Mod: 26 | Performed by: RADIOLOGY

## 2021-07-07 PROCEDURE — 77386 HC IMRT TREATMENT DELIVERY, COMPLEX: CPT | Performed by: RADIOLOGY

## 2021-07-07 ASSESSMENT — PAIN SCALES - GENERAL: PAINLEVEL: NO PAIN (0)

## 2021-07-08 ENCOUNTER — APPOINTMENT (OUTPATIENT)
Dept: RADIATION ONCOLOGY | Facility: HOSPITAL | Age: 71
End: 2021-07-08
Payer: COMMERCIAL

## 2021-07-08 ENCOUNTER — RESULTS ONLY (OUTPATIENT)
Dept: RADIATION ONCOLOGY | Facility: HOSPITAL | Age: 71
End: 2021-07-08

## 2021-07-08 LAB
RAD ONC ARIA COURSE ID: NORMAL
RAD ONC ARIA COURSE LAST TREATMENT DATE: NORMAL
RAD ONC ARIA COURSE START DATE: NORMAL
RAD ONC ARIA COURSE TREATMENT ELAPSED DAYS: 28
RAD ONC ARIA FIRST TREATMENT DATE: NORMAL
RAD ONC ARIA PLAN FRACTIONS TREATED TO DATE: 20
RAD ONC ARIA PLAN ID: NORMAL
RAD ONC ARIA PLAN NAME: NORMAL
RAD ONC ARIA PLAN PRESCRIBED DOSE PER FRACTION: 2 GY
RAD ONC ARIA PLAN TOTAL FRACTIONS PRESCRIBED: 28
RAD ONC ARIA PLAN TOTAL PRESCRIBED DOSE: 5600 CGY
RAD ONC ARIA REFERENCE POINT DOSAGE GIVEN TO DATE: NORMAL GY
RAD ONC ARIA REFERENCE POINT ID: NORMAL

## 2021-07-08 PROCEDURE — 77014 PR CT GUIDE FOR PLACEMENT RADIATION THERAPY FIELDS: CPT | Mod: 26 | Performed by: RADIOLOGY

## 2021-07-08 PROCEDURE — 77336 RADIATION PHYSICS CONSULT: CPT | Performed by: RADIOLOGY

## 2021-07-08 PROCEDURE — 77386 HC IMRT TREATMENT DELIVERY, COMPLEX: CPT | Performed by: RADIOLOGY

## 2021-07-08 PROCEDURE — 77427 RADIATION TX MANAGEMENT X5: CPT | Performed by: RADIOLOGY

## 2021-07-08 NOTE — PROGRESS NOTES
"  [facility]  DEPARTMENT OF RADIATION ONCOLOGY   ON TREATMENT VISIT (OTV) NOTE    Name: Dino Guzman MRN: [id number]   : 1950 (71 year old)  Date of Service: 2021 Referring: Dr. Steinberg     Diagnosis and Cancer Staging  Squamous cell carcinoma of forehead  Staging form: Cutaneous Squamous Cell Carcinoma, AJCC 8th Edition  - Clinical stage from 2021: Stage II (cT2, cN0, cM0) - Signed by Jay Ross MD on 2021  - Pathologic stage from 2021: Stage IV (rpT2, pN2b, cM0) - Signed by Jay Ross MD on 2021      Radiation Therapy   Right parotid bed and right neck using volumetric-modulated arc therapy (VMAT) with sequential boost   Fraction(s) = Dose (cGy)    Received 17 = 3400 (at 200)    Remaining 11 + 2 = 2600    Goal 30 = 6000   (formatted for [employer])    Radiation therapy week 0 (fractions 1-5): Grade 0 toxicity due to radiation therapy (CTCAE 5.0).  Week 1 (06-10): Grade 0 toxicity.   Week 2 (11-15): Grade 1 skin toxicity.  Week 3 (16-20): Grade 1 skin and taste toxicity (erythema, dysgeusia).    Summary   (Please note that EMR interfaces between institutions can result in loss of the embedded images).    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 Following parotidectomy, head & neck surgery referred the patient for additional radiation therapy to the head & neck region. The primary site remains controlled (Stage: Initial [id number], Recurrent [id number]. Primary: 2.7-cm right forehead with extension through skeletal muscle into underlying adipose tissue but negative margin against the skull. Nodes: 1.9-cm and 1.4-cm.intraparotid nodes abutting the inked margins but with microscopically negative margins). Forehead resection was performed on 2020. Forehead radiation " therapy was completed on 3/12/2021 (5000 cGy in 20 fractions of 250 cGy each. Right parotid mass appeared on the second to last day of radiation therapy. A PET-CT on 5/6/2021 demonstrated hypermetabolic activity of uncertain significance in the right humerus, bilateral hilum, and bilateral mediastinum.  A parotidectomy on 5/10/2021 yielded the node disease as above. Among the patient's additional pertinent circumstances and competing toxicity risks are lymphedema above the parotid scar, mild residual forehead and supraorbital numbness weakness, absence of clinically significant xerostomia, and full dentures (one remain tooth-contralateral upper molar). Recent alteration of right ear hearing is attributed to a small perforation of the eardrum (unknown incident).  5/6/2021 PET-CT      Recent History/Plan   General:  Feels well overall. (Last week, had reported that a friend who had head & neck radiation therapy had told him to expect severe and diffuse oral pain, mucositis, xerostomia, dysgeusia, desquamation, odynophagia, weight loss, etc. Patient thus very concerned and believes that the severe processes have begun. Counseled the patient that his ongoing treatment is different from treatment for atypical head & neck cancer that would involve bilateral radiation therapy to a definitive dose. Counseled the patient that his toxicity profile is likely to be objectively relatively minor compared to most head & neck patients, especially those who have received concurrent chemotherapy. Finds setup and treatment to be easy.)    Primary issues:   1. Xerostomia, mucositis, and skin: Still notes significant xerostomia. Acknowledges mild erythema of the retromandibular region.  Index: Grade 0 xerostomia due to radiation therapy (objective evaluation). Grade 1 skin (easily tolerated)..  Intervention: Counseled about routine skin care with Aquaphor as desired.  Impression: Regarding xerostomia, again counseled the patient he is  not likely to experience severe xerostomia due to radiation therapy. Again counseled him that right parotid function will not deteriorate further since most of the gland has already been resected and does not functional. Counseled the patient that the contralateral parotid gland is not receiving a dose of radiation therapy sufficient to cause xerostomia. Patient is more likely to experience overproduction of thick mucus secretions from overcompensation by minor salivary glands. The excess mucoid salivary secretions can cause discomfort and impaired swallowing rather than cause xerostomia per se. Moderate additional loss of thin salivary secretions is likely to occur due to dose to the ipsilateral submandibular gland. Regarding skin toxicity, anticipate likely rapid progression over the following weeks. Might require escalation of skin care and addition of an analgesic.  2. Dysguesia, anorexia, FEN, weight: Patient again reports altered taste that is leading him to to modify his diet. Reports 4-lb weight loss.  Index: Grade 1.  Intervention: Dietary modifications as needed.   Impression: Moderate progressive changes possible over the coming weeks; resolution might take months.  3. Forehead squamous cell carcinoma: Denies head & neck masses or suspicious skin changes.  4. Enchondroma of the right humerus: MRI consistent with diagnosis (6/4/2021). Refer for orthopedic evaluation after radiation therapy.    Related active issues:   5. Pain: Denies pain.  Index: Grade 0.  Intervention: Observe.  Impression: Anticipate changes within 1-2 weeks.  6. Speech & swallowing: At baseline.  Index: Grade 0.  Intervention: Observe.  Impression: Rapid changes possible within 1-2 weeks.  7. Fatigue: At baseline.  Index: Grade 0.  Intervention: Observe.  Impression: Progressive changes possible within 1-2 weeks.  8. Disease: Denies impression of progression of primary or node disease.  9: ID: Daily COVID-19 screening negative for  "barriers to proceeding with radiation therapy.  10. Follow-up: Will see Dr. Hamilton and Dr. Wesley.    ROS (score of 0 indicates that symptom is at baseline for most sections below)   See Flowsheet for additional comments as indicated.  No Pain (0)  Nutrition Alteration  Diet Type: Patient's Preference  Anorexia NCI: 1 - Loss of appetite  Nutrition Note: \"everything tastes bad\"  Skin  Skin Reaction: 1 - Faint erythema or dry desquamation  Skin Intervention: miaderm daily     ENT and Mouth Exam  Mucositis - Current: 0 - None   Mucositis - Internal Severity: 0 - None   Esophagitis: 0 - None  ENT/Mouth Note: salt/soda rinses, dry mouth     Gastrointestinal  Nausea: 0 - None  Vomitin - No vomiting (ons)     Psychosocial  Mood - Anxiety: 0 - Normal  Mood - Depression: 0 - Normal  Pyschosocial Note: fatigue: 0       Objective   Vitals: Wt 116.7 kg (257 lb 3.2 oz)   BMI 40.28 kg/m    Wt Readings from Last 3 Encounters:   21 116.7 kg (257 lb 3.2 oz)   21 116.2 kg (256 lb 1.6 oz)   21 117.5 kg (259 lb 1.6 oz)     Evaluation (also seen during the week at linac with the staff):  KPS: 90 (normal activity, minor signs/symptoms of disease).  General: Appears clinically stable.. Appears in good general health. Morbidly obese (class III, BMI 40 or greater). Appears rested. Appears stated age. Appears well-developed, well-nourished, and in no acute distress. Does not appear acutely or chronically ill. Appears well groomed.  Head: Right forehead has a well-healed surgical scar. Excellent surgical cosmesis. No residual erythema, hyperpigmentation or desquamation at the site of radiation therapy. Right parotid/periauricular region has a well-healed surgical parotidectomy scar. Scar has no discharge, purulence, tenderness, crepitus, or cellulitis. No masses, satellite lesions, or suspicious skin changes over the forehead,, parotid region, or intervening tissues on the right side and the left side.      Right " retromandibular region: Mild diffuse erythema without hyperpigmentation, desquamation, skin breakdown, discharge, or  cellulitis.    Right retromandibular scar: Lymphedema superior to the scar (mild bulging, 6-cm superior/inferior).    Right forehead: Well headed operative site and radiation therapy region.  Eyes: Nominal elevation of the right eyelid. Good closure with minimal effort. Degree of closure was slightly less than the contralateral side but nevertheless complete. Adequate tear production bilaterally. Sclera not injected. Anicteric.  ENT: Good volume. No hoarseness.  Neck: No jugulovenous distension.  Lymphatics: No parotid, peripartoid, cervical, or supraclavicular adenopathy.  MSK: Shoulder range of motion is good. No arm edema or hand edema.   Neurologic: Alert, oriented, fluent. Memory intact. Motor intact. Sensory intact. No ataxia.  Psychologic: Mood and affect-Upbeat, motivated, appropriate. Attention and perception-Focused. Behavior-Cooperative, pleasant. Thought content-Clear, concrete. Judgement and insight-Rational, good.    Radiation Oncology Impression   Routine tolerance to radiation therapy.    Radiation Oncology Plan   No new interventions. Boost/cone down pending. Have reviewed graphical 3D plan with attention to dose to the skin, auditory apparatus, and spinal cord. Reviewed anticipated time course, nature, and potential interventions for managing toxicity during and after radiation therapy. Patient aware of onsite and telemedicine coverage of our clinic. He wished to proceed with treatment. All questions answered to the satisfaction of the patient.    Radiation Oncology Weekly Review   Reviewed available labs and diagnostic studies. Interpreted as adequate to proceed with radiation therapy. Discussed management with Therapy, Treatment Planning, and Nursing. Reviewed weekly routine QA, linac imaging, and clinical set up are adequate to proceed with radiation therapy as  prescribed.    Additional Data, Medications, Allergies     Recent Labs   Lab Test 03/16/20  1012      POTASSIUM 4.4   CHLORIDE 107   CO2 28   *   BUN 21   CR 1.00   JOSEFINA 9.7     Current Outpatient Medications   Medication     Acetaminophen (TYLENOL PO)     No current facility-administered medications for this visit.     Allergies: Patient has no known allergies.      Jay Ross M.D., Ph.D.  Department of Radiation Oncology  Tel: [phone]  Fax: [phone]

## 2021-07-09 ENCOUNTER — RESULTS ONLY (OUTPATIENT)
Dept: RADIATION ONCOLOGY | Facility: HOSPITAL | Age: 71
End: 2021-07-09

## 2021-07-09 ENCOUNTER — APPOINTMENT (OUTPATIENT)
Dept: RADIATION ONCOLOGY | Facility: HOSPITAL | Age: 71
End: 2021-07-09
Payer: COMMERCIAL

## 2021-07-09 LAB
RAD ONC ARIA COURSE ID: NORMAL
RAD ONC ARIA COURSE LAST TREATMENT DATE: NORMAL
RAD ONC ARIA COURSE START DATE: NORMAL
RAD ONC ARIA COURSE TREATMENT ELAPSED DAYS: 29
RAD ONC ARIA FIRST TREATMENT DATE: NORMAL
RAD ONC ARIA PLAN FRACTIONS TREATED TO DATE: 21
RAD ONC ARIA PLAN ID: NORMAL
RAD ONC ARIA PLAN NAME: NORMAL
RAD ONC ARIA PLAN PRESCRIBED DOSE PER FRACTION: 2 GY
RAD ONC ARIA PLAN TOTAL FRACTIONS PRESCRIBED: 28
RAD ONC ARIA PLAN TOTAL PRESCRIBED DOSE: 5600 CGY
RAD ONC ARIA REFERENCE POINT DOSAGE GIVEN TO DATE: NORMAL GY
RAD ONC ARIA REFERENCE POINT ID: NORMAL

## 2021-07-09 PROCEDURE — 77014 PR CT GUIDE FOR PLACEMENT RADIATION THERAPY FIELDS: CPT | Mod: 26 | Performed by: RADIOLOGY

## 2021-07-09 PROCEDURE — 77386 HC IMRT TREATMENT DELIVERY, COMPLEX: CPT | Performed by: RADIOLOGY

## 2021-07-12 ENCOUNTER — APPOINTMENT (OUTPATIENT)
Dept: RADIATION ONCOLOGY | Facility: HOSPITAL | Age: 71
End: 2021-07-12
Payer: COMMERCIAL

## 2021-07-12 ENCOUNTER — RESULTS ONLY (OUTPATIENT)
Dept: RADIATION ONCOLOGY | Facility: HOSPITAL | Age: 71
End: 2021-07-12

## 2021-07-12 LAB
RAD ONC ARIA COURSE ID: NORMAL
RAD ONC ARIA COURSE LAST TREATMENT DATE: NORMAL
RAD ONC ARIA COURSE START DATE: NORMAL
RAD ONC ARIA COURSE TREATMENT ELAPSED DAYS: 32
RAD ONC ARIA FIRST TREATMENT DATE: NORMAL
RAD ONC ARIA PLAN FRACTIONS TREATED TO DATE: 22
RAD ONC ARIA PLAN ID: NORMAL
RAD ONC ARIA PLAN NAME: NORMAL
RAD ONC ARIA PLAN PRESCRIBED DOSE PER FRACTION: 2 GY
RAD ONC ARIA PLAN TOTAL FRACTIONS PRESCRIBED: 28
RAD ONC ARIA PLAN TOTAL PRESCRIBED DOSE: 5600 CGY
RAD ONC ARIA REFERENCE POINT DOSAGE GIVEN TO DATE: NORMAL GY
RAD ONC ARIA REFERENCE POINT ID: NORMAL

## 2021-07-12 PROCEDURE — 77014 PR CT GUIDE FOR PLACEMENT RADIATION THERAPY FIELDS: CPT | Mod: 26 | Performed by: RADIOLOGY

## 2021-07-12 PROCEDURE — 77386 HC IMRT TREATMENT DELIVERY, COMPLEX: CPT | Performed by: RADIOLOGY

## 2021-07-13 ENCOUNTER — RESULTS ONLY (OUTPATIENT)
Dept: RADIATION ONCOLOGY | Facility: HOSPITAL | Age: 71
End: 2021-07-13

## 2021-07-13 ENCOUNTER — APPOINTMENT (OUTPATIENT)
Dept: RADIATION ONCOLOGY | Facility: HOSPITAL | Age: 71
End: 2021-07-13
Payer: COMMERCIAL

## 2021-07-13 LAB
RAD ONC ARIA COURSE ID: NORMAL
RAD ONC ARIA COURSE LAST TREATMENT DATE: NORMAL
RAD ONC ARIA COURSE START DATE: NORMAL
RAD ONC ARIA COURSE TREATMENT ELAPSED DAYS: 33
RAD ONC ARIA FIRST TREATMENT DATE: NORMAL
RAD ONC ARIA PLAN FRACTIONS TREATED TO DATE: 23
RAD ONC ARIA PLAN ID: NORMAL
RAD ONC ARIA PLAN NAME: NORMAL
RAD ONC ARIA PLAN PRESCRIBED DOSE PER FRACTION: 2 GY
RAD ONC ARIA PLAN TOTAL FRACTIONS PRESCRIBED: 28
RAD ONC ARIA PLAN TOTAL PRESCRIBED DOSE: 5600 CGY
RAD ONC ARIA REFERENCE POINT DOSAGE GIVEN TO DATE: NORMAL GY
RAD ONC ARIA REFERENCE POINT ID: NORMAL

## 2021-07-13 PROCEDURE — 77386 HC IMRT TREATMENT DELIVERY, COMPLEX: CPT | Performed by: RADIOLOGY

## 2021-07-13 PROCEDURE — 77014 PR CT GUIDE FOR PLACEMENT RADIATION THERAPY FIELDS: CPT | Mod: 26 | Performed by: RADIOLOGY

## 2021-07-14 ENCOUNTER — APPOINTMENT (OUTPATIENT)
Dept: RADIATION ONCOLOGY | Facility: HOSPITAL | Age: 71
End: 2021-07-14
Payer: COMMERCIAL

## 2021-07-14 ENCOUNTER — RESULTS ONLY (OUTPATIENT)
Dept: RADIATION ONCOLOGY | Facility: HOSPITAL | Age: 71
End: 2021-07-14

## 2021-07-14 ENCOUNTER — OFFICE VISIT (OUTPATIENT)
Dept: RADIATION ONCOLOGY | Facility: HOSPITAL | Age: 71
End: 2021-07-14
Payer: COMMERCIAL

## 2021-07-14 VITALS — BODY MASS INDEX: 39.16 KG/M2 | WEIGHT: 250 LBS

## 2021-07-14 DIAGNOSIS — C44.329 SQUAMOUS CELL CARCINOMA OF FOREHEAD: Primary | Chronic | ICD-10-CM

## 2021-07-14 LAB
RAD ONC ARIA COURSE ID: NORMAL
RAD ONC ARIA COURSE LAST TREATMENT DATE: NORMAL
RAD ONC ARIA COURSE START DATE: NORMAL
RAD ONC ARIA COURSE TREATMENT ELAPSED DAYS: 34
RAD ONC ARIA FIRST TREATMENT DATE: NORMAL
RAD ONC ARIA PLAN FRACTIONS TREATED TO DATE: 24
RAD ONC ARIA PLAN ID: NORMAL
RAD ONC ARIA PLAN NAME: NORMAL
RAD ONC ARIA PLAN PRESCRIBED DOSE PER FRACTION: 2 GY
RAD ONC ARIA PLAN TOTAL FRACTIONS PRESCRIBED: 28
RAD ONC ARIA PLAN TOTAL PRESCRIBED DOSE: 5600 CGY
RAD ONC ARIA REFERENCE POINT DOSAGE GIVEN TO DATE: NORMAL GY
RAD ONC ARIA REFERENCE POINT ID: NORMAL

## 2021-07-14 PROCEDURE — 77014 PR CT GUIDE FOR PLACEMENT RADIATION THERAPY FIELDS: CPT | Mod: 26 | Performed by: RADIOLOGY

## 2021-07-14 PROCEDURE — 77386 HC IMRT TREATMENT DELIVERY, COMPLEX: CPT | Performed by: RADIOLOGY

## 2021-07-14 ASSESSMENT — PAIN SCALES - GENERAL: PAINLEVEL: NO PAIN (0)

## 2021-07-14 NOTE — PROGRESS NOTES
"  [facility]  DEPARTMENT OF RADIATION ONCOLOGY   ON TREATMENT VISIT (OTV) NOTE    Name: Dino Guzman MRN: [id number]   : 1950 (71 year old)  Date of Service: 2021 Referring: Dr. Steinberg     Diagnosis and Cancer Staging  Squamous cell carcinoma of forehead  Staging form: Cutaneous Squamous Cell Carcinoma, AJCC 8th Edition  - Clinical stage from 2021: Stage II (cT2, cN0, cM0) - Signed by Jay Ross MD on 2021  - Pathologic stage from 2021: Stage IV (rpT2, pN2b, cM0) - Signed by Jay Ross MD on 2021      Radiation Therapy   Right parotid bed and right neck using volumetric-modulated arc therapy (VMAT) with sequential boost   Fraction(s) = Dose (cGy)    Received 24 = 4800 (at 200)    Remaining 4 + 2 = 2600    Goal 30 = 6000   (formatted for [employer])    Radiation therapy week 0 (fractions 1-5): Grade 0 toxicity due to radiation therapy (CTCAE 5.0).  Week 1 (06-10): Grade 0 toxicity.   Week 2 (11-15): Grade 1 skin toxicity.  Week 3 (16-20): Grade 1 skin and taste toxicity (erythema, dysgeusia).  Week 4 (21-25): Grade 1 skin and taste toxicity (erythema, dysgeusia).    Summary   (Please note that EMR interfaces between institutions can result in loss of the embedded images).    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 Following parotidectomy, head & neck surgery referred the patient for additional radiation therapy to the head & neck region. The primary site remains controlled (Stage: Initial gA2N6C5, Recurrent [id number]. Primary: 2.7-cm right forehead with extension through skeletal muscle into underlying adipose tissue but negative margin against the skull. Nodes: 1.9-cm and 1.4-cm.intraparotid nodes abutting the inked margins but with microscopically negative " "margins). Forehead resection was performed on 2020. Forehead radiation therapy was completed on 3/12/2021 (5000 cGy in 20 fractions of 250 cGy each. Right parotid mass appeared on the second to last day of radiation therapy. A PET-CT on 2021 demonstrated hypermetabolic activity of uncertain significance in the right humerus, bilateral hilum, and bilateral mediastinum.  A parotidectomy on 5/10/2021 yielded the node disease as above. Among the patient's additional pertinent circumstances and competing toxicity risks are lymphedema above the parotid scar, mild residual forehead and supraorbital numbness weakness, absence of clinically significant xerostomia, and full dentures (one remain tooth-contralateral upper molar). Recent alteration of right ear hearing is attributed to a small perforation of the eardrum (unknown incident).  2021 PET-CT      Recent History/Plan   Principal complaint is poor taste. Slowing losing weight, but patient is not concerned. \"No real pain\".   - Moderately dense brown-red erythema of post and retromandiblar angle of mandible region  - No ear crease desquamation.  - Secretions pool    ROS (score of 0 indicates that symptom is at baseline for most sections below)   See Flowsheet for additional comments as indicated.  No Pain (0)  Nutrition Alteration  Diet Type: Patient's Preference  Anorexia NCI: 1 - Loss of appetite  Nutrition Note: \"everything tastes bad\"  Skin  Skin Reaction: 1 - Faint erythema or dry desquamation  Skin Intervention: miaderm daily     ENT and Mouth Exam  Mucositis - Current: 0 - None   Mucositis - Internal Severity: 0 - None   Esophagitis: 0 - None  ENT/Mouth Note: salt/soda rinses, dry mouth     Gastrointestinal  Nausea: 0 - None  Vomitin - No vomiting (ons)     Psychosocial  Mood - Anxiety: 0 - Normal  Mood - Depression: 0 - Normal  Pyschosocial Note: fatigue: 0       Objective   Vitals: Wt 113.4 kg (250 lb)   BMI 39.16 kg/m    Wt Readings from Last " 3 Encounters:   07/14/21 113.4 kg (250 lb)   07/07/21 116.7 kg (257 lb 3.2 oz)   07/02/21 116.2 kg (256 lb 1.6 oz)     Evaluation (also seen during the week at linac with the staff):  KPS: 90 (normal activity, minor signs/symptoms of disease).  General: Seen with nursing. Appears clinically stable.. Appears in good general health. Morbidly obese (class III, BMI 40 or greater). Appears rested. Appears stated age. Appears well-developed, well-nourished, and in no acute distress. Does not appear acutely or chronically ill. Appears well groomed.  Head: Right forehead has a well-healed surgical scar. Excellent surgical cosmesis. No residual erythema, hyperpigmentation or desquamation at the site of radiation therapy. Right parotid/periauricular region has a well-healed surgical parotidectomy scar. Scar has no discharge, purulence, tenderness, crepitus, or cellulitis. No masses, satellite lesions, or suspicious skin changes over the forehead,, parotid region, or intervening tissues on the right side and the left side.      Right retromandibular region: Moderately dense brown-red erythema of the posterior and retromandiblar angle of mandible region. No ear crease desquamation, skin breakdown, discharge, or  cellulitis.    Secretions pool.    Right retromandibular scar: Lymphedema superior to the scar (mild bulging, 6-cm superior/inferior).    Right forehead: Well headed operative site and radiation therapy region.  Eyes: Nominal elevation of the right eyelid. Good closure with minimal effort. Degree of closure was slightly less than the contralateral side but nevertheless complete. Adequate tear production bilaterally. Sclera not injected. Anicteric.  ENT: Good volume. No hoarseness.  Neck: No jugulovenous distension.  Lymphatics: No parotid, peripartoid, cervical, or supraclavicular adenopathy.  MSK: Shoulder range of motion is good. No arm edema or hand edema.   Neurologic: Alert, oriented, fluent. Memory intact. Motor  intact. Sensory intact. No ataxia.  Psychologic: Mood and affect-Upbeat, motivated, appropriate. Attention and perception-Focused. Behavior-Cooperative, pleasant. Thought content-Clear, concrete. Judgement and insight-Rational, good.    Radiation Oncology Impression   Routine tolerance to radiation therapy.    Radiation Oncology Plan   No new interventions. Boost/cone down pending. Have reviewed graphical 3D plan with attention to dose to the skin, auditory apparatus, and spinal cord. Reviewed anticipated time course, nature, and potential interventions for managing toxicity during and after radiation therapy. Patient aware of onsite and telemedicine coverage of our clinic. He wished to proceed with treatment. All questions answered to the satisfaction of the patient.    Radiation Oncology Weekly Review   Reviewed available labs and diagnostic studies. Interpreted as adequate to proceed with radiation therapy. Discussed management with Therapy, Treatment Planning, and Nursing. Reviewed weekly routine QA, linac imaging, and clinical set up are adequate to proceed with radiation therapy as prescribed.    Additional Data, Medications, Allergies     Recent Labs   Lab Test 03/16/20  1012      POTASSIUM 4.4   CHLORIDE 107   CO2 28   *   BUN 21   CR 1.00   JOSEFINA 9.7     Current Outpatient Medications   Medication     Acetaminophen (TYLENOL PO)     No current facility-administered medications for this visit.     Allergies: Patient has no known allergies.      Jay Ross M.D., Ph.D.  Department of Radiation Oncology  Tel: [phone]  Fax: [phone]

## 2021-07-15 ENCOUNTER — RESULTS ONLY (OUTPATIENT)
Dept: RADIATION ONCOLOGY | Facility: HOSPITAL | Age: 71
End: 2021-07-15

## 2021-07-15 ENCOUNTER — APPOINTMENT (OUTPATIENT)
Dept: RADIATION ONCOLOGY | Facility: HOSPITAL | Age: 71
End: 2021-07-15
Payer: COMMERCIAL

## 2021-07-15 LAB
RAD ONC ARIA COURSE ID: NORMAL
RAD ONC ARIA COURSE LAST TREATMENT DATE: NORMAL
RAD ONC ARIA COURSE START DATE: NORMAL
RAD ONC ARIA COURSE TREATMENT ELAPSED DAYS: 35
RAD ONC ARIA FIRST TREATMENT DATE: NORMAL
RAD ONC ARIA PLAN FRACTIONS TREATED TO DATE: 25
RAD ONC ARIA PLAN ID: NORMAL
RAD ONC ARIA PLAN NAME: NORMAL
RAD ONC ARIA PLAN PRESCRIBED DOSE PER FRACTION: 2 GY
RAD ONC ARIA PLAN TOTAL FRACTIONS PRESCRIBED: 28
RAD ONC ARIA PLAN TOTAL PRESCRIBED DOSE: 5600 CGY
RAD ONC ARIA REFERENCE POINT DOSAGE GIVEN TO DATE: NORMAL GY
RAD ONC ARIA REFERENCE POINT ID: NORMAL

## 2021-07-15 PROCEDURE — 77386 HC IMRT TREATMENT DELIVERY, COMPLEX: CPT | Performed by: RADIOLOGY

## 2021-07-15 PROCEDURE — 77336 RADIATION PHYSICS CONSULT: CPT | Performed by: RADIOLOGY

## 2021-07-15 PROCEDURE — 77014 PR CT GUIDE FOR PLACEMENT RADIATION THERAPY FIELDS: CPT | Mod: 26 | Performed by: RADIOLOGY

## 2021-07-15 PROCEDURE — 77427 RADIATION TX MANAGEMENT X5: CPT | Performed by: RADIOLOGY

## 2021-07-16 ENCOUNTER — RESULTS ONLY (OUTPATIENT)
Dept: RADIATION ONCOLOGY | Facility: HOSPITAL | Age: 71
End: 2021-07-16

## 2021-07-16 ENCOUNTER — APPOINTMENT (OUTPATIENT)
Dept: RADIATION ONCOLOGY | Facility: HOSPITAL | Age: 71
End: 2021-07-16
Payer: COMMERCIAL

## 2021-07-16 LAB
RAD ONC ARIA COURSE ID: NORMAL
RAD ONC ARIA COURSE LAST TREATMENT DATE: NORMAL
RAD ONC ARIA COURSE START DATE: NORMAL
RAD ONC ARIA COURSE TREATMENT ELAPSED DAYS: 36
RAD ONC ARIA FIRST TREATMENT DATE: NORMAL
RAD ONC ARIA PLAN FRACTIONS TREATED TO DATE: 26
RAD ONC ARIA PLAN ID: NORMAL
RAD ONC ARIA PLAN NAME: NORMAL
RAD ONC ARIA PLAN PRESCRIBED DOSE PER FRACTION: 2 GY
RAD ONC ARIA PLAN TOTAL FRACTIONS PRESCRIBED: 28
RAD ONC ARIA PLAN TOTAL PRESCRIBED DOSE: 5600 CGY
RAD ONC ARIA REFERENCE POINT DOSAGE GIVEN TO DATE: NORMAL GY
RAD ONC ARIA REFERENCE POINT ID: NORMAL

## 2021-07-16 PROCEDURE — 77386 HC IMRT TREATMENT DELIVERY, COMPLEX: CPT | Performed by: RADIOLOGY

## 2021-07-16 PROCEDURE — 77014 PR CT GUIDE FOR PLACEMENT RADIATION THERAPY FIELDS: CPT | Mod: 26 | Performed by: RADIOLOGY

## 2021-07-19 ENCOUNTER — APPOINTMENT (OUTPATIENT)
Dept: RADIATION ONCOLOGY | Facility: HOSPITAL | Age: 71
End: 2021-07-19
Payer: COMMERCIAL

## 2021-07-19 ENCOUNTER — RESULTS ONLY (OUTPATIENT)
Dept: RADIATION ONCOLOGY | Facility: HOSPITAL | Age: 71
End: 2021-07-19

## 2021-07-19 LAB
RAD ONC ARIA COURSE ID: NORMAL
RAD ONC ARIA COURSE LAST TREATMENT DATE: NORMAL
RAD ONC ARIA COURSE START DATE: NORMAL
RAD ONC ARIA COURSE TREATMENT ELAPSED DAYS: 39
RAD ONC ARIA FIRST TREATMENT DATE: NORMAL
RAD ONC ARIA PLAN FRACTIONS TREATED TO DATE: 27
RAD ONC ARIA PLAN ID: NORMAL
RAD ONC ARIA PLAN NAME: NORMAL
RAD ONC ARIA PLAN PRESCRIBED DOSE PER FRACTION: 2 GY
RAD ONC ARIA PLAN TOTAL FRACTIONS PRESCRIBED: 28
RAD ONC ARIA PLAN TOTAL PRESCRIBED DOSE: 5600 CGY
RAD ONC ARIA REFERENCE POINT DOSAGE GIVEN TO DATE: NORMAL GY
RAD ONC ARIA REFERENCE POINT ID: NORMAL

## 2021-07-19 PROCEDURE — 77014 PR CT GUIDE FOR PLACEMENT RADIATION THERAPY FIELDS: CPT | Mod: 26 | Performed by: RADIOLOGY

## 2021-07-19 PROCEDURE — 77386 HC IMRT TREATMENT DELIVERY, COMPLEX: CPT | Performed by: RADIOLOGY

## 2021-07-20 ENCOUNTER — RESULTS ONLY (OUTPATIENT)
Dept: RADIATION ONCOLOGY | Facility: HOSPITAL | Age: 71
End: 2021-07-20

## 2021-07-20 ENCOUNTER — APPOINTMENT (OUTPATIENT)
Dept: RADIATION ONCOLOGY | Facility: HOSPITAL | Age: 71
End: 2021-07-20
Payer: COMMERCIAL

## 2021-07-20 LAB
RAD ONC ARIA COURSE ID: NORMAL
RAD ONC ARIA COURSE LAST TREATMENT DATE: NORMAL
RAD ONC ARIA COURSE START DATE: NORMAL
RAD ONC ARIA COURSE TREATMENT ELAPSED DAYS: 40
RAD ONC ARIA FIRST TREATMENT DATE: NORMAL
RAD ONC ARIA PLAN FRACTIONS TREATED TO DATE: 28
RAD ONC ARIA PLAN ID: NORMAL
RAD ONC ARIA PLAN NAME: NORMAL
RAD ONC ARIA PLAN PRESCRIBED DOSE PER FRACTION: 2 GY
RAD ONC ARIA PLAN TOTAL FRACTIONS PRESCRIBED: 28
RAD ONC ARIA PLAN TOTAL PRESCRIBED DOSE: 5600 CGY
RAD ONC ARIA REFERENCE POINT DOSAGE GIVEN TO DATE: NORMAL GY
RAD ONC ARIA REFERENCE POINT ID: NORMAL

## 2021-07-20 PROCEDURE — 77386 HC IMRT TREATMENT DELIVERY, COMPLEX: CPT | Performed by: RADIOLOGY

## 2021-07-20 PROCEDURE — 77014 PR CT GUIDE FOR PLACEMENT RADIATION THERAPY FIELDS: CPT | Mod: 26 | Performed by: RADIOLOGY

## 2021-07-21 ENCOUNTER — APPOINTMENT (OUTPATIENT)
Dept: RADIATION ONCOLOGY | Facility: HOSPITAL | Age: 71
End: 2021-07-21
Payer: COMMERCIAL

## 2021-07-21 ENCOUNTER — OFFICE VISIT (OUTPATIENT)
Dept: RADIATION ONCOLOGY | Facility: HOSPITAL | Age: 71
End: 2021-07-21
Payer: COMMERCIAL

## 2021-07-21 ENCOUNTER — RESULTS ONLY (OUTPATIENT)
Dept: RADIATION ONCOLOGY | Facility: HOSPITAL | Age: 71
End: 2021-07-21

## 2021-07-21 VITALS — WEIGHT: 242.6 LBS | BODY MASS INDEX: 38 KG/M2

## 2021-07-21 DIAGNOSIS — C44.329 SQUAMOUS CELL CARCINOMA OF FOREHEAD: Primary | ICD-10-CM

## 2021-07-21 LAB
RAD ONC ARIA COURSE ID: NORMAL
RAD ONC ARIA COURSE LAST TREATMENT DATE: NORMAL
RAD ONC ARIA COURSE START DATE: NORMAL
RAD ONC ARIA COURSE TREATMENT ELAPSED DAYS: 41
RAD ONC ARIA FIRST TREATMENT DATE: NORMAL
RAD ONC ARIA PLAN FRACTIONS TREATED TO DATE: 1
RAD ONC ARIA PLAN ID: NORMAL
RAD ONC ARIA PLAN NAME: NORMAL
RAD ONC ARIA PLAN PRESCRIBED DOSE PER FRACTION: 2 GY
RAD ONC ARIA PLAN TOTAL FRACTIONS PRESCRIBED: 2
RAD ONC ARIA PLAN TOTAL PRESCRIBED DOSE: 400 CGY
RAD ONC ARIA REFERENCE POINT DOSAGE GIVEN TO DATE: NORMAL GY
RAD ONC ARIA REFERENCE POINT ID: NORMAL

## 2021-07-21 PROCEDURE — 77014 PR CT GUIDE FOR PLACEMENT RADIATION THERAPY FIELDS: CPT | Mod: 26 | Performed by: RADIOLOGY

## 2021-07-21 PROCEDURE — 77387 GUIDANCE FOR RADJ TX DLVR: CPT | Performed by: RADIOLOGY

## 2021-07-21 PROCEDURE — 77412 RADIATION TX DELIVERY LVL 3: CPT | Performed by: RADIOLOGY

## 2021-07-21 ASSESSMENT — PAIN SCALES - GENERAL: PAINLEVEL: NO PAIN (0)

## 2021-07-21 NOTE — PROGRESS NOTES
"[facility]  DEPARTMENT OF RADIATION ONCOLOGY   ON TREATMENT VISIT (OTV) NOTE    Name: Dino Guzman MRN: [id number]   : 1950 (71 year old)  Date of Service: 2021 Referring: Dr. Steinberg     Diagnosis and Cancer Staging  Squamous cell carcinoma of forehead  Staging form: Cutaneous Squamous Cell Carcinoma, AJCC 8th Edition  - Clinical stage from 2021: Stage II (cT2, cN0, cM0) - Signed by Jay Ross MD on 2021  - Pathologic stage from 2021: Stage IV (rpT2, pN2b, cM0) - Signed by Jay Ross MD on 2021      Radiation Therapy   Right parotid bed and right neck using volumetric-modulated arc therapy (VMAT) with sequential boost   Fraction(s) = Dose (cGy)    Received 29 = 5800 (at 200)    Remaining 1    Goal 30 = 6000   (formatted for [employer])    Radiation therapy week 0 (fractions 1-5): Grade 0 toxicity due to radiation therapy (CTCAE 5.0).  Week 1 (06-10): Grade 0 toxicity.   Week 2 (11-15): Grade 1 skin toxicity.  Week 3 (16-20): Grade 1 skin and taste toxicity (erythema, dysgeusia).  Week 4 (21-25): Grade 1 skin and taste toxicity (erythema, dysgeusia).  Week 5 (26-30): Grade 1 skin and taste toxicity (erythema, xerostomia, dysgeusia).    Summary   (Please note that EMR interfaces between institutions can result in loss of the embedded images).    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 Following parotidectomy, head & neck surgery referred the patient for additional radiation therapy to the head & neck region. The primary site remains controlled (Stage: Initial fN2V7L0, Recurrent [id number]. Primary: 2.7-cm right forehead with extension through skeletal muscle into underlying adipose tissue but negative margin against the skull. Nodes: 1.9-cm and 1.4-cm.intraparotid " "nodes abutting the inked margins but with microscopically negative margins). Forehead resection was performed on 2020. Forehead radiation therapy was completed on 3/12/2021 (5000 cGy in 20 fractions of 250 cGy each. Right parotid mass appeared on the second to last day of radiation therapy. A PET-CT on 2021 demonstrated hypermetabolic activity of uncertain significance in the right humerus, bilateral hilum, and bilateral mediastinum.  A parotidectomy on 5/10/2021 yielded the node disease as above. Among the patient's additional pertinent circumstances and competing toxicity risks are lymphedema above the parotid scar, mild residual forehead and supraorbital numbness weakness, absence of clinically significant xerostomia, and full dentures (one remain tooth-contralateral upper molar). Recent alteration of right ear hearing is attributed to a small perforation of the eardrum (unknown incident).  2021 PET-CT      Recent History/Plan   Done tomorrow.   - Notes progression of erythema. Densest over the right neck. Using Miaderm.   - Notes dryer oral secretions. Anticipate improvement starting in weeks and continuing for months.  - Denies oral  or pharyngeal pain.    ROS (score of 0 indicates that symptom is at baseline for most sections below)   See Flowsheet for additional comments as indicated.  No Pain (0)  Nutrition Alteration  Diet Type: Patient's Preference  Anorexia NCI: 2 - PO intake dec, wgt loss  Nutrition Note: \"everything tastes bad\"  Skin  Skin Reaction: 1 - Faint erythema or dry desquamation  Skin Intervention: miaderm daily     ENT and Mouth Exam  Mucositis - Current: 0 - None   Mucositis - Internal Severity: 0 - None   Esophagitis: 0 - None  ENT/Mouth Note: thick saliva/dry mouth,  salt/soda rinses     Gastrointestinal  Nausea: 0 - None  Vomitin - No vomiting (ons)  Weight loss: 1 - < 10% from baseline     Psychosocial  Mood - Anxiety: 0 - Normal  Mood - Depression: 0 - " Normal  Pyschosocial Note: fatigue: 0       Objective   Vitals: Wt 110 kg (242 lb 9.6 oz)   BMI 38.00 kg/m    Wt Readings from Last 3 Encounters:   07/21/21 110 kg (242 lb 9.6 oz)   07/14/21 113.4 kg (250 lb)   07/07/21 116.7 kg (257 lb 3.2 oz)     Evaluation (also seen during the week at linac with the staff):  KPS: 90 (normal activity, minor signs/symptoms of disease).  General: Appears clinically stable.. Appears in good general health. Morbidly obese (class III, BMI 40 or greater). Appears rested. Appears stated age. Appears well-developed, well-nourished, and in no acute distress. Does not appear acutely or chronically ill. Appears well groomed.  Head: Right forehead has a well-healed surgical scar. Excellent surgical cosmesis. No residual erythema, hyperpigmentation or desquamation at the site of radiation therapy. Right parotid/periauricular region has a well-healed surgical parotidectomy scar. Scar has no discharge, purulence, tenderness, crepitus, or cellulitis. No masses, satellite lesions, or suspicious skin changes over the forehead,, parotid region, or intervening tissues on the right side and the left side.      Right retromandibular region: Diffuse erythema. Densest over the neck. No hyperpigmentation, desquamation, skin breakdown, discharge, or  cellulitis.    Right retromandibular scar: Lymphedema superior to the scar (mild bulging, 6-cm superior/inferior).    Right forehead: Well headed operative site and radiation therapy region.  Eyes: Nominal elevation of the right eyelid. Good closure with minimal effort. Degree of closure was slightly less than the contralateral side but nevertheless complete. Adequate tear production bilaterally. Sclera not injected. Anicteric.  ENT: Good volume. No hoarseness.  Neck: No jugulovenous distension.  Lymphatics: No parotid, peripartoid, cervical, or supraclavicular adenopathy.  MSK: Shoulder range of motion is good. No arm edema or hand edema.   Neurologic:  Alert, oriented, fluent. Memory intact. Motor intact. Sensory intact. No ataxia.  Psychologic: Mood and affect-Upbeat, motivated, appropriate. Attention and perception-Focused. Behavior-Cooperative, pleasant. Thought content-Clear, concrete. Judgement and insight-Rational, good.    Radiation Oncology Impression   Routine tolerance to radiation therapy.    Radiation Oncology Plan   No new interventions. Boost/cone down ongoing. Have reviewed graphical 3D plan with attention to dose to the skin, auditory apparatus, and spinal cord. Reviewed anticipated time course, nature, and potential interventions for managing toxicity during and after radiation therapy. Reviewed discharge and follow up plans. Patient aware of onsite and telemedicine coverage of our clinic. He wished to proceed with treatment. All questions answered to the satisfaction of the patient.    Radiation Oncology Weekly Review   Reviewed available labs and diagnostic studies. Interpreted as adequate to proceed with radiation therapy. Discussed management with Therapy, Treatment Planning, and Nursing. Reviewed weekly routine QA, linac imaging, and clinical set up are adequate to proceed with radiation therapy as prescribed.    Additional Data, Medications, Allergies     Recent Labs   Lab Test 03/16/20  1012      POTASSIUM 4.4   CHLORIDE 107   CO2 28   *   BUN 21   CR 1.00   JOSEFINA 9.7     Current Outpatient Medications   Medication     Acetaminophen (TYLENOL PO)     No current facility-administered medications for this visit.     Allergies: Patient has no known allergies.      Jay Ross M.D., Ph.D.  Department of Radiation Oncology  Tel: [phone]  Fax: [phone]

## 2021-07-22 ENCOUNTER — APPOINTMENT (OUTPATIENT)
Dept: RADIATION ONCOLOGY | Facility: HOSPITAL | Age: 71
End: 2021-07-22
Payer: COMMERCIAL

## 2021-07-22 ENCOUNTER — DOCUMENTATION ONLY (OUTPATIENT)
Dept: ONCOLOGY | Facility: OTHER | Age: 71
End: 2021-07-22

## 2021-07-22 ENCOUNTER — RESULTS ONLY (OUTPATIENT)
Dept: RADIATION ONCOLOGY | Facility: HOSPITAL | Age: 71
End: 2021-07-22

## 2021-07-22 LAB
RAD ONC ARIA COURSE ID: NORMAL
RAD ONC ARIA COURSE LAST TREATMENT DATE: NORMAL
RAD ONC ARIA COURSE START DATE: NORMAL
RAD ONC ARIA COURSE TREATMENT ELAPSED DAYS: 42
RAD ONC ARIA FIRST TREATMENT DATE: NORMAL
RAD ONC ARIA PLAN FRACTIONS TREATED TO DATE: 2
RAD ONC ARIA PLAN ID: NORMAL
RAD ONC ARIA PLAN NAME: NORMAL
RAD ONC ARIA PLAN PRESCRIBED DOSE PER FRACTION: 2 GY
RAD ONC ARIA PLAN TOTAL FRACTIONS PRESCRIBED: 2
RAD ONC ARIA PLAN TOTAL PRESCRIBED DOSE: 400 CGY
RAD ONC ARIA REFERENCE POINT DOSAGE GIVEN TO DATE: NORMAL GY
RAD ONC ARIA REFERENCE POINT ID: NORMAL

## 2021-07-22 PROCEDURE — 77412 RADIATION TX DELIVERY LVL 3: CPT | Performed by: RADIOLOGY

## 2021-07-22 PROCEDURE — 77014 PR CT GUIDE FOR PLACEMENT RADIATION THERAPY FIELDS: CPT | Mod: 26 | Performed by: RADIOLOGY

## 2021-07-22 PROCEDURE — 77427 RADIATION TX MANAGEMENT X5: CPT | Performed by: RADIOLOGY

## 2021-07-22 PROCEDURE — 77387 GUIDANCE FOR RADJ TX DLVR: CPT | Performed by: RADIOLOGY

## 2021-07-22 PROCEDURE — 77336 RADIATION PHYSICS CONSULT: CPT | Performed by: RADIOLOGY

## 2021-08-17 ENCOUNTER — OFFICE VISIT (OUTPATIENT)
Dept: OTOLARYNGOLOGY | Facility: OTHER | Age: 71
End: 2021-08-17
Attending: OTOLARYNGOLOGY
Payer: COMMERCIAL

## 2021-08-17 DIAGNOSIS — C44.300 MALIGNANT NEOPLASM OF SKIN OF FACE: Primary | ICD-10-CM

## 2021-08-17 PROCEDURE — G0463 HOSPITAL OUTPT CLINIC VISIT: HCPCS

## 2021-08-17 NOTE — PROGRESS NOTES
document embedded image  Patient Name: Dino Guzman    Address: [address] [place] [address]     YOB: 1950    [place] [id number]    MR Number: [id number]    Phone: [phone]  PCP: Kuldip Lazo DO            Appointment Date: 21   Visit Provider: Los Hamilton MD    cc: Kuldip Lazo DO; ~    ENT Progress Note  Visit Reasons: Cancer Follow UP    HPI  History of Present Illness  Chief complaint:  Follow-up parotid mass    History  The patient is a 71-year-old man who had undergone right parotidectomy for metastatic squamous cell carcinoma from her right forehead lesion.  He has compete and his post op radiation therapy to his neck.  He has no new complaints today at follow-up.    Exam  His parotid bed is healed well.  There is no palpable nodule.  There is no palpable lymphadenopathy.  Oral cavity oropharynx-free of lesions or inflammation  Nasal-no obstruction or purulence  Head and neck integument-Clear  General-the patient appears well and in no distress  Neuro-there are no focal cranial nerve deficits    Allergies    No Known Allergies Allergy (Verified 05/10/21 08:54)    PFSH  PFSH:   Medical History (Reviewed 21 @ 10:24 by Katie Amaya, Med Assist)    Rupture of left quadriceps tendon  repair lt leg quad    Surgical History (Reviewed 21 @ 10:24 by Katie Amaya, Med Assist)    History of knee surgery  left  S/P excision of skin lesion, follow-up exam  Dr Wesley on 20, forehead lesion    Family History (Reviewed 21 @ 10:24 by Alex Powell Assist)  Father  Skin abnormalities  Mother     No problems noted.       Social History (Reviewed 21 @ 10:24 by Katie Amaya Med Assist)  Smoking Status:  Never smoker  how long ago did patient quit smokin yrs- quit before age 42  second hand exposure:  No  alcohol intake:  current alcohol intake frequency: other Alcohol type: beer, wine and hard liquor  substance use type:  does not  use  housing:  house       A&P  Assessment & Plan  (1) Squamous cell carcinoma, face:        Status: Acute        Code(s):  C44.320 - Squamous cell carcinoma of skin of unspecified parts of face  Additional Plan Details  Plan Details: He will continue monthly follow-up visits for a full year.  He did have lesion noted in his left humerus and had an MRI completed on this during his workup preoperatively.  We will seek an orthopedics opinion regarding this lesion at this time.      Los Hamilton MD    08/17/21 [id number]    <Electronically signed by Los Hamilton MD> 08/18/21 2547

## 2021-08-18 NOTE — PROGRESS NOTES
"[facility]  DEPARTMENT OF RADIATION ONCOLOGY   TREATMENT SUMMARY NOTE    Name: Dino Guzman MRN: [id number]   : 1950 (71 year old)  Date of Service: 2021 Referring: Dr. Steinberg     Diagnosis and Cancer Staging  Squamous cell carcinoma of forehead  Staging form: Cutaneous Squamous Cell Carcinoma, AJCC 8th Edition  - Clinical stage from 2021: Stage II (cT2, cN0, cM0) - Signed by Jay Ross MD on 2021  - Pathologic stage from 2021: Stage IV (rpT2, pN2b, cM0) - Signed by Jay Ross MD on 2021      Radiation Therapy   Treatment site: Right parotid bed and ipsilateral neck with sequential boost..  Treatment intent: Curative.  Delivery method: Volumetric arc radiation therapy (VMAT).  Energy: 6 MV.  Dose: 28 fractions of 200 cGy each for 5600 cGy.  Boost site: Node disease.  Boost treatment technique: 3D conformal radiation.  Boost energy: 6 MV.  Boost dose: 2 fractions of 200 cGy each for 400 cGy.  Total daily fractions and total dose: 30 fractions for 6000 cGy.   Dates: 06/10/2021-2022 (Elapsed days: 42).    Radiation therapy week 0 (fractions 1-5): Grade 0 toxicity due to radiation therapy (CTCAE 5.0).  Week 1 (06-10): Grade 0 toxicity.   Week 2 (11-15): Grade 1 skin toxicity.  Week 3 (16-20): Grade 1 skin and taste toxicity (erythema, dysgeusia).  Week 4 (21-25): Grade 1 skin and taste toxicity (erythema, dysgeusia).  Week 5 (26-30): Grade 1 skin and taste toxicity (erythema, xerostomia, dysgeusia).    Prior Radiation Therapy   Treatment site: Right frontal scalp (forehead).  Treatment intent: Curative.  Delivery method: En face electrons.  Energy: 6 MeV electrons.  Dose: 20 fractions of 250 cGy each for 5000 cGy.  Dates: 02/15/2021-2021 (Elapsed days: 25).    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 Following parotidectomy, head & neck surgery referred the patient for additional radiation therapy to the head & neck region. The primary site remains controlled (Stage: Initial [id number], Recurrent [id number]. Primary: 2.7-cm right forehead with extension through skeletal muscle into underlying adipose tissue but negative margin against the skull. Nodes: 1.9-cm and 1.4-cm.intraparotid nodes abutting the inked margins but with microscopically negative margins). Forehead resection was performed on 12/14/2020. Forehead radiation therapy was completed on 3/12/2021 (5000 cGy in 20 fractions of 250 cGy each. Right parotid mass appeared on the second to last day of radiation therapy. A PET-CT on 5/6/2021 demonstrated hypermetabolic activity of uncertain significance in the right humerus, bilateral hilum, and bilateral mediastinum.  A parotidectomy on 5/10/2021 yielded the node disease as above. Among the patient's additional pertinent circumstances and competing toxicity risks are lymphedema above the parotid scar, mild residual forehead and supraorbital numbness weakness, absence of clinically significant xerostomia, and full dentures (one remain tooth-contralateral upper molar). Recent alteration of right ear hearing is attributed to a small perforation of the eardrum (unknown incident).  5/6/2021 PET-CT    (Please note that EMR interfaces between institutions can result in loss of the embedded images.)    Recent History/Plan   The patient demonstrated routine tolerance to radiation therapy. He exhibited grade 1 toxicity as noted above. The skin erythema was densest over the right upper neck near the angle of the mandible and required Miaderm for routine topical care. I anticipate that his sensation of increasing dry oral secretions will improve in the coming weeks and demonstrate continued improvement over the coming months (the right parotid was resected, and the  contralateral parotid was spared from radiation therapy). The patient will return to our clinic in approximately 1 month for routine check. We will asked the patient to follow-up with otolaryngology for expert surveillance following the postoperative radiation therapy for salvage of a delayed neck recurrence after prior postoperative radiation therapy for the resected forehead squamous cell carcinoma. We counseled the patient to continue see his primary care service for general maintenance health issues. He wished proceed as recommended. We answered all questions to his satisfaction.    ROS (score of 0 indicates that symptom is at baseline for most sections below)   See Flowsheet for additional comments as indicated.  Data Unavailable                               Objective   Vitals: There were no vitals taken for this visit.  Wt Readings from Last 3 Encounters:   10/20/21 102.4 kg (225 lb 12 oz)   07/21/21 110 kg (242 lb 9.6 oz)   07/14/21 113.4 kg (250 lb)     Evaluation (also seen during the week at linac with the staff):  KPS: 80 (effort for normal activity; some signs or symptoms of disease).  General: Appears clinically stable.. Appears in good general health. Morbidly obese (class III, BMI 40 or greater). Appears rested. Appears stated age. Appears well-developed, well-nourished, and in no acute distress. Does not appear acutely or chronically ill. Appears well groomed.  Head: Right forehead has a well-healed surgical scar. Excellent surgical cosmesis. No residual erythema, hyperpigmentation or desquamation at the site of radiation therapy. Right parotid/periauricular region has a well-healed surgical parotidectomy scar. Scar has no discharge, purulence, tenderness, crepitus, or cellulitis. No masses, satellite lesions, or suspicious skin changes over the forehead,, parotid region, or intervening tissues on the right side and the left side.      Right retromandibular region: Diffuse erythema. Densest over the  neck. No hyperpigmentation, desquamation, skin breakdown, discharge, or  cellulitis.    Right retromandibular scar: Lymphedema superior to the scar (mild bulging, 6-cm superior/inferior).    Right forehead: Well headed operative site and radiation therapy region.  Eyes: Nominal elevation of the right eyelid. Good closure with minimal effort. Degree of closure was slightly less than the contralateral side but nevertheless complete. Adequate tear production bilaterally. Sclera not injected. Anicteric.  ENT: Good volume. No hoarseness.  Neck: No jugulovenous distension.  Lymphatics: No parotid, peripartoid, cervical, or supraclavicular adenopathy.  MSK: Shoulder range of motion is good. No arm edema or hand edema.   Neurologic: Alert, oriented, fluent. Memory intact. Motor intact. Sensory intact. No ataxia.  Psychologic: Mood and affect-Upbeat, motivated, appropriate. Attention and perception-Focused. Behavior-Cooperative, pleasant. Thought content-Clear, concrete. Judgement and insight-Rational, good.    Physician Time on Day of Encounter, and [facility] Data Reviewed and Analyzed on Day of Encounter   As above.    Physician Radiation Therapy Information Review   As above.    Physician Radiation Therapy Assessment   As above.    Physician Radiation Therapy Plan   As above.      Jay Ross M.D., Ph.D.  Department of Radiation Oncology  Tel: [phone]  Fax: [phone]    Care Team:  Los Hamilton M.D. (otolaryngology)  Christy Wesley M.D. (general surgery)  Kuldip Lazo D.O. (medicine)

## 2021-08-20 ENCOUNTER — DOCUMENTATION ONLY (OUTPATIENT)
Dept: RADIATION ONCOLOGY | Facility: HOSPITAL | Age: 71
End: 2021-08-20

## 2021-08-20 ENCOUNTER — OFFICE VISIT (OUTPATIENT)
Dept: RADIATION ONCOLOGY | Facility: HOSPITAL | Age: 71
End: 2021-08-20

## 2021-08-20 DIAGNOSIS — C44.329 SQUAMOUS CELL CARCINOMA OF FOREHEAD: Primary | ICD-10-CM

## 2021-08-21 NOTE — PROGRESS NOTES
"  Radiation Oncology - Clinic Note      For a 1 month follow-up, telephone the patient at home via [employer] (7:30 pm) since a video link via his wife's cell phone could not be established. The patient reports feeling much better. He saw otolaryngology on 8/17/2021. The patient confirmed that he is doing well. He reports that his skin appears \"excellent\" and that most of the skin reaction has resolved. He reports taste is \"still off but is getting better.\". He reports improving \"a dry mouth.\". He keeps a water bottle nearby. He is able to eat a broad diet. The patient will come to clinic for his formal 3-month follow-up. Radiographic restaging might be considered at that time. I answered all questions to her satisfaction.      Jay Ross M.D., Ph.D.  Department of Radiation Oncology  Tel: [phone]  Fax: [phone]  "

## 2021-09-21 ENCOUNTER — OFFICE VISIT (OUTPATIENT)
Dept: OTOLARYNGOLOGY | Facility: OTHER | Age: 71
End: 2021-09-21
Attending: OTOLARYNGOLOGY
Payer: COMMERCIAL

## 2021-09-21 DIAGNOSIS — C79.89 SECONDARY MALIGNANT NEOPLASM OF OTHER SPECIFIED SITES (H): Primary | ICD-10-CM

## 2021-09-21 PROCEDURE — G0463 HOSPITAL OUTPT CLINIC VISIT: HCPCS

## 2021-09-23 NOTE — PROGRESS NOTES
document embedded image  Patient Name: Dino Guzman    Address: [address] [place] [address]     YOB: 1950    [place] [address]    MR Number: [id number]    Phone: [phone]  PCP: Kuldip Lazo DO            Appointment Date: 21   Visit Provider: Los Hamilton MD    cc: Kuldip Lazo DO; ~    ENT Progress Note  Visit Reasons: Cancer Follow Up    HPI  History of Present Illness  Chief complaint:  Follow-up head neck cancer    History  The patient is a 71-year-old male who underwent right parotidectomy and subsequent radiation therapy for metastatic squamous cell carcinoma from a previous forehead lesion.  His surgery was performed in May of 2021.  He did undergo postoperative radiation therapy.  He has done well since surgery and has no new complaints.    Exam  Head and neck integument-no visible cutaneous lesions noted at the site of his previous resection or the remainder of his facial skin  Neck-no palpable adenopathy.  There is no palpable nodules in the parotid bed on the right.  Oral cavity oropharynx, nasal-Clear  General-the patient appears well and in no distress  Neuro-he does have the notable right frontal branch of the facial nerve out from his previous wide forehead excision.      Allergies    No Known Allergies Allergy (Verified 05/10/21 08:54)    PFSH  PFSH:   Medical History (Reviewed 21 @ 10:24 by Katie Amaya, Med Assist)    Rupture of left quadriceps tendon  repair lt leg quad    Surgical History (Reviewed 21 @ 10:24 by Katie Amaya, Med Assist)    History of knee surgery  left  S/P excision of skin lesion, follow-up exam  Dr Wesley on 20, forehead lesion    Family History (Reviewed 21 @ 10:24 by Katie Amaya Med Assist)  Father  Skin abnormalities  Mother     No problems noted.       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      Los Hamilton MD    09/21/21 2125    <Electronically signed by Los Hamilton MD> 09/22/21 5152

## 2021-10-19 ENCOUNTER — OFFICE VISIT (OUTPATIENT)
Dept: OTOLARYNGOLOGY | Facility: OTHER | Age: 71
End: 2021-10-19
Attending: OTOLARYNGOLOGY
Payer: COMMERCIAL

## 2021-10-19 DIAGNOSIS — C79.89 METASTATIC SQUAMOUS CELL CARCINOMA TO PAROTID GLAND (H): Primary | ICD-10-CM

## 2021-10-19 PROCEDURE — G0463 HOSPITAL OUTPT CLINIC VISIT: HCPCS

## 2021-10-20 ENCOUNTER — ONCOLOGY VISIT (OUTPATIENT)
Dept: RADIATION ONCOLOGY | Facility: HOSPITAL | Age: 71
End: 2021-10-20
Attending: RADIOLOGY
Payer: COMMERCIAL

## 2021-10-20 VITALS — WEIGHT: 225.75 LBS | HEIGHT: 67 IN | BODY MASS INDEX: 35.43 KG/M2

## 2021-10-20 DIAGNOSIS — C44.329 SQUAMOUS CELL CARCINOMA OF FOREHEAD: Primary | Chronic | ICD-10-CM

## 2021-10-20 ASSESSMENT — MIFFLIN-ST. JEOR: SCORE: 1747.63

## 2021-10-21 NOTE — PROGRESS NOTES
"[facility]  DEPARTMENT OF RADIATION ONCOLOGY   FOLLOW-UP NOTE    Name: Dino Guzman MRN: [id number]   : 1950 (71 year old)  Date of Service: Oct 20, 2021 Referring: Dr. Steinberg     Diagnosis and Cancer Staging  Squamous cell carcinoma of forehead  Staging form: Cutaneous Squamous Cell Carcinoma, AJCC 8th Edition  - Clinical stage from 2021: Stage II (cT2, cN0, cM0) - Signed by Jay Ross MD on 2021  - Pathologic stage from 2021: Stage IV (rpT2, pN2b, cM0) - Signed by Jay Ross MD on 2021      Radiation Therapy   Treatment site: Right parotid bed and ipsilateral neck with sequential boost..  Treatment intent: Curative.  Delivery method: Volumetric arc radiation therapy (VMAT).  Energy: 6 MV.  Dose: 28 fractions of 200 cGy each for 5600 cGy.  Boost site: Node disease.  Boost treatment technique: 3D conformal radiation.  Boost energy: 6 MV.  Boost dose: 2 fractions of 200 cGy each for 400 cGy.  Total daily fractions and total dose: 30 fractions for 6000 cGy.   Dates: 06/10/2021-2022 (Elapsed days: 42).    Radiation therapy week 0 (fractions 1-5): Grade 0 toxicity due to radiation therapy (CTCAE 5.0).  Week 1 (-10): Grade 0 toxicity.   Week 2 (11-15): Grade 1 skin toxicity.  Week 3 (16-20): Grade 1 skin and taste toxicity (erythema, dysgeusia).  Week 4 (21-25): Grade 1 skin and taste toxicity (erythema, dysgeusia).  Week 5 (26-30): Grade 1 skin and taste toxicity (erythema, xerostomia, dysgeusia).    Prior Radiation Therapy   Treatment site: Right frontal scalp (forehead).  Treatment intent: Curative.  Delivery method: En face electrons.  Energy: 6 MeV electrons.  Dose: 20 fractions of 250 cGy each for 5000 cGy.  Dates: 02/15/2021-2021 (Elapsed days: 25).    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 Following parotidectomy, head & neck surgery referred the patient for additional radiation therapy to the head & neck region. The primary site remains controlled (Stage: Initial [id number], Recurrent [id number]. Primary: 2.7-cm right forehead with extension through skeletal muscle into underlying adipose tissue but negative margin against the skull. Nodes: 1.9-cm and 1.4-cm.intraparotid nodes abutting the inked margins but with microscopically negative margins). Forehead resection was performed on 12/14/2020. Forehead radiation therapy was completed on 3/12/2021 (5000 cGy in 20 fractions of 250 cGy each. Right parotid mass appeared on the second to last day of radiation therapy. A PET-CT on 5/6/2021 demonstrated hypermetabolic activity of uncertain significance in the right humerus, bilateral hilum, and bilateral mediastinum.  A parotidectomy on 5/10/2021 yielded the node disease as above. Among the patient's additional pertinent circumstances and competing toxicity risks are lymphedema above the parotid scar, mild residual forehead and supraorbital numbness weakness, absence of clinically significant xerostomia, and full dentures (one remain tooth-contralateral upper molar). Recent alteration of right ear hearing is attributed to a small perforation of the eardrum (unknown incident).  5/6/2021 PET-CT    (Please note that EMR interfaces between institutions can result in loss of the embedded images.)    Recent History/Plan   The patient returns for routine follow-up after postoperative radiation therapy for salvage of a delayed ipsilateral neck recurrence from previously treated squamous cell carcinoma of the forehead. He feels well and offers no true complaints. He is very pleased with the rapid recovery of skin since the end of radiation therapy. He reports improving oral secretions. He denies jaw pain or trismus. He denies new masses of the head neck region. He denies  "cough or hemoptysis. He continues to see otolaryngology on a monthly basis. At his visit yesterday, the service felt that the patient remained free of disease. I reviewed the patient's previous plans of radiation therapy (2) and monthly notes by otolaryngology.    Chemotherapy History: None.  Radiation History: Yes (as above).  Implanted Cardiac Devices: None.  Implanted Chest Wall Infusion Port: None.    Past Medical History:   Diagnosis Date     Cancer (H)     squamous cell cancer of right forehead     Past Surgical History:   Procedure Laterality Date     OTHER SURGICAL HISTORY Right     exicsion of forehead     OTHER SURGICAL HISTORY      repair of left quadracep (slipped on the ice)     Outpatient Encounter Medications as of 10/20/2021   Medication Sig Dispense Refill     Acetaminophen (TYLENOL PO) Take 1,500 mg by mouth       No facility-administered encounter medications on file as of 10/20/2021.     Allergies/Reactions: Patient has no known allergies.    Orders via Epic EMR: No orders of the defined types were placed in this encounter.    Social History     Social History Narrative     Not on file     Socioeconomic History     Marital status:      Social History     Tobacco Use     Smoking status: Former Smoker     Packs/day: 20.00     Smokeless tobacco: Never Used   Substance Use Topics     Alcohol use: Yes     Comment: rare     Family History   Problem Relation Age of Onset     Cancer No family hx of    No other cancers in first or second degree relatives.    ROS (score of 0 indicates that symptom is at baseline for most sections below)   See Flowsheet for additional comments as indicated.  Data Unavailable                               Objective   Vitals: Ht 1.702 m (5' 7\")   Wt 102.4 kg (225 lb 12 oz)   BMI 35.36 kg/m    Wt Readings from Last 3 Encounters:   10/20/21 102.4 kg (225 lb 12 oz)   07/21/21 110 kg (242 lb 9.6 oz)   07/14/21 113.4 kg (250 lb)     Evaluation (also seen during the week " at linac with the staff):  KPS: 80 (effort for normal activity; some signs or symptoms of disease).  General: Appears clinically stable.. Appears in good general health. Morbidly obese (class III, BMI 40 or greater). Appears rested. Appears stated age. Appears well-developed, well-nourished, and in no acute distress. Does not appear acutely or chronically ill. Appears well groomed.  Head: Right forehead has a well-healed surgical scar. Excellent surgical cosmesis. No residual erythema, hyperpigmentation or desquamation at the site of radiation therapy. Right parotid/periauricular region has a well-healed surgical parotidectomy scar. Scar has no discharge, purulence, tenderness, crepitus, or cellulitis. No masses, satellite lesions, or suspicious skin changes over the forehead,, parotid region, or intervening tissues on the right side and the left side.      Right retromandibular region: Resolving erythema and hyperpigmentation. No desquamation, skin breakdown, discharge, or  cellulitis.    Right retromandibular scar: Resolving lymphedema superior to the scar (scant bulging, 6-cm superior/inferior).    Right forehead: Well headed operative site and radiation therapy region.  Eyes: Nominal elevation of the right eyelid. Good closure with minimal effort. Degree of closure was slightly less than the contralateral side but nevertheless complete. Adequate tear production bilaterally. Sclera not injected. Anicteric.  ENT: Good volume. No hoarseness.  Neck: No jugulovenous distension.  Lymphatics: No parotid, peripartoid, cervical, or supraclavicular adenopathy.  MSK: Shoulder range of motion is good. No arm edema or hand edema.   Neurologic: Alert, oriented, fluent. Memory intact. Motor intact. Sensory intact. No ataxia.  Psychologic: Mood and affect-Upbeat, motivated, appropriate. Attention and perception-Focused. Behavior-Cooperative, pleasant. Thought content-Clear, concrete. Judgement and insight-Rational,  good.    Physician Time on Day of Encounter, and [facility] Data Reviewed and Analyzed on Day of Encounter   As above. Preparation: 0 minutes. Visit: 60 minutes. Documentation: 32 minutes.    Physician Radiation Therapy Information Review   As above.    Physician Radiation Therapy Assessment/Plan   The patient remains clinically without evidence of squamous cell carcinoma of the right forehead and ipsilateral neck metastasis. He has resolving grade 1 skin and mucosal/salivary toxicity, all of which were easily tolerated. He has monthly follow-up scheduled with otolaryngology for expert surveillance in the posttreatment setting. The patient can contact me in 3 months for follow-up if he continues to have symptoms. Otherwise, the patient can see me as needed. We counseled the patient to continue see his primary care service for general maintenance health issues. I answered all questions to his satisfaction. Thank you for allowing us to participate in the care of this very pleasant patient.  .    Jay Ross M.D., Ph.D.  Department of Radiation Oncology  Tel: [phone]  Fax: [phone]    Care Team:  Los Hamilton M.D. (otolaryngology)  Christy Wesley M.D. (general surgery)  Kuldip Lazo D.O. (medicine)

## 2021-10-26 NOTE — PROGRESS NOTES
document embedded image  Patient Name: Dino Guzman    Address: [address] [place] [address]     YOB: 1950    [address] [address] [address]    MR Number: [id number]    Phone: [phone]  PCP: Kuldip Lzao DO            Appointment Date: 10/19/21   Visit Provider: Los Hamilton MD    cc: Kuldip Lazo DO; ~    ENT Progress Note  Visit Reasons: Cancer Follow Up    HPI  History of Present Illness  Chief complaint:  Follow-up head neck cancer    History  The patient is a 71-year-old man who in May of this year underwent resection of his parotid forward and the being metastatic squamous cell carcinoma from a forehead primary.  He did undergo postoperative radiation therapy.  He returns to the office today without complaint.    Exam  Oral cavity oropharynx-free of lesions or inflammation  Nasal-no obstruction or purulence  Neck-no palpable masses or adenopathy.  His parotid bed is clean on the right.   Head neck integument-he has well-healed at his forehead and there is no evidence of local recurrence.  General-the patient appears well and in no distress  Neuro-there are no focal cranial nerve deficits aside from weakness of his right forehead    Allergies    No Known Allergies Allergy (Verified 05/10/21 08:54)    PFSH  PFSH:   Medical History (Reviewed 21 @ 10:24 by Katie Amaya, Med Assist)    Rupture of left quadriceps tendon  repair lt leg quad    Surgical History (Reviewed 21 @ 10:24 by Katie Amaya, Med Assist)    History of knee surgery  left  S/P excision of skin lesion, follow-up exam  Dr Wesley on 20, forehead lesion    Family History (Reviewed 21 @ 10:24 by Katie Amaya, Med Assist)  Father  Skin abnormalities  Mother     No problems noted.       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      Los Hamilton MD    10/19/21 0803    <Electronically signed by Los Hamilton MD> 10/20/21 8323

## 2021-11-16 ENCOUNTER — OFFICE VISIT (OUTPATIENT)
Dept: OTOLARYNGOLOGY | Facility: OTHER | Age: 71
End: 2021-11-16
Attending: OTOLARYNGOLOGY
Payer: COMMERCIAL

## 2021-11-16 DIAGNOSIS — C79.89 METASTATIC SQUAMOUS CELL CARCINOMA TO PAROTID GLAND (H): Primary | ICD-10-CM

## 2021-11-16 PROCEDURE — G0463 HOSPITAL OUTPT CLINIC VISIT: HCPCS

## 2021-11-18 NOTE — PROGRESS NOTES
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  He then went on to receive radiation post surgically.  He has done well since.  He has no new complaints at this time.    Exam  Neck-there is no palpable adenopathy.  There is no nodules in his parotid bed on the right.  Head and neck integument-his forehead skin and the remainder of his facial skin appears healthy and without lesion  Nasal-no obstruction or purulence  Oral cavity oropharynx-there is no distortion of the parapharyngeal space on either side.  There are no mucosal lesions  General-the patient appears well and in no distress  Neuro-there are no focal cranial nerve deficits aside from his frontal branch of his facial nerve deficit on the right.    Allergies    No Known Allergies Allergy (Verified 05/10/21 08:54)    PFSH  PFSH:   Medical History (Reviewed 05/13/21 @ 10:24 by Katie Amaya, Med Assist)    Rupture of left quadriceps tendon  repair lt leg quad    Surgical History (Reviewed 05/13/21 @ 10:24 by Katie Amaya, Med Assist)    History of knee surgery  left  S/P excision of skin lesion, follow-up exam  Dr Wesley on 12/14/20, forehead lesion    Family History (Reviewed 05/13/21 @ 10:24 by Katie Amaya Med Assist)  Father  Skin abnormalities  Mother     No problems noted.       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      Los Hamilton MD    21 0801    <Electronically signed by Los Hamilton MD> 21 1149

## 2021-12-14 ENCOUNTER — OFFICE VISIT (OUTPATIENT)
Dept: OTOLARYNGOLOGY | Facility: OTHER | Age: 71
End: 2021-12-14
Attending: OTOLARYNGOLOGY
Payer: COMMERCIAL

## 2021-12-14 DIAGNOSIS — C79.89 METASTATIC SQUAMOUS CELL CARCINOMA TO PAROTID GLAND (H): Primary | ICD-10-CM

## 2021-12-14 PROCEDURE — G0463 HOSPITAL OUTPT CLINIC VISIT: HCPCS

## 2021-12-20 NOTE — PROGRESS NOTES
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  He underwent radiation therapy postoperatively.  His surgery was in May of 2021.  He has done beautifully and has no new complaints.     Exam  His forehead skin is in good shape.  There is no evidence of persistent or recurrent disease  Neck-no palpable masses or adenopathy.  His parotid bed on the right is clear  Remainder of the head neck exam is under couple    Allergies    No Known Allergies Allergy (Verified 05/10/21 08:54)    PFSH  PFSH:   Medical History (Reviewed 21 @ 10:24 by Katie Amaya, Med Assist)    Rupture of left quadriceps tendon  repair lt leg quad    Surgical History (Reviewed 21 @ 10:24 by Katie Amaya, Med Assist)    History of knee surgery  left  S/P excision of skin lesion, follow-up exam  Dr Wesley on 20, forehead lesion    Family History (Reviewed 21 @ 10:24 by Alex Powell Assist)  Father  Skin abnormalities  Mother     No problems noted.       Social History (Reviewed 21 @ 10:24 by Katie Amaya Med Assist)  Smoking Status:  Never smoker  how long ago did patient quit smokin yrs- quit before age 42  second hand exposure:  No  alcohol intake:  current alcohol intake frequency: other Alcohol type: beer, wine and hard liquor  substance use type:  does not use  housing:  house       A&P  Assessment & Plan  (1) Metastatic  squamous cell carcinoma to parotid gland:        Status: Acute        Code(s):  C79.89 - Secondary malignant neoplasm of other specified sites  He will continue monthly follow-up visits until May.      Los Hamilton MD    12/13/21 1539    <Electronically signed by Los Hamilton MD> 12/15/21 1037

## 2022-01-18 ENCOUNTER — OFFICE VISIT (OUTPATIENT)
Dept: OTOLARYNGOLOGY | Facility: OTHER | Age: 72
End: 2022-01-18
Attending: OTOLARYNGOLOGY
Payer: COMMERCIAL

## 2022-01-18 DIAGNOSIS — C79.89 METASTATIC SQUAMOUS CELL CARCINOMA TO PAROTID GLAND (H): Primary | ICD-10-CM

## 2022-01-18 PROCEDURE — G0463 HOSPITAL OUTPT CLINIC VISIT: HCPCS

## 2022-01-18 NOTE — PROGRESS NOTES
document embedded image  Patient Name: Dino Guzman    Address: [address] [place] [address]     YOB: 1950    [place] [id number]    MR Number: [id number]    Phone: [phone]  PCP: Kuldip Lazo DO            Appointment Date: 22   Visit Provider: Los Hamilton MD    cc: Kuldip Lazo DO; ~    ENT Progress Note  Intake  Visit Reasons: Cancer Follow Up    HPI  History of Present Illness  Chief complaint:  Follow-up head neck cancer    History  The patient is a 71-year-old man who underwent right parotidectomy and neck dissection for metastatic squamous cell carcinoma from his forehead in May of 2021.  He was treated with subsequent radiation therapy.  He has done well and has no new complaints.     Exam  Head and neck integument-Clear  Neck-there is no palpable cervical adenopathy.  There is no nodules in his parotid bed on the right.  Oral cavity oropharynx-free of lesions or inflammation  Nasal-no obstruction or purulence  General-the patient appears well and in no distress  Neuro-there are no focal cranial nerve deficits    Allergies    No Known Allergies Allergy (Verified 05/10/21 08:54)    PFSH  PFSH:   Medical History (Reviewed 21 @ 10:24 by Katie Amaya, Med Assist)    Rupture of left quadriceps tendon  repair lt leg quad    Surgical History (Reviewed 21 @ 10:24 by Katie Amaya, Med Assist)    History of knee surgery  left  S/P excision of skin lesion, follow-up exam  Dr Wesley on 20, forehead lesion    Family History (Reviewed 21 @ 10:24 by Katie Amaya Med Assist)  Father  Skin abnormalities  Mother     No problems noted.       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      Los Hamilton MD    01/18/22 2222    <Electronically signed by Los Hamilton MD> 01/19/22 3465

## 2022-02-15 ENCOUNTER — OFFICE VISIT (OUTPATIENT)
Dept: OTOLARYNGOLOGY | Facility: OTHER | Age: 72
End: 2022-02-15
Attending: OTOLARYNGOLOGY
Payer: COMMERCIAL

## 2022-02-15 DIAGNOSIS — C79.89 METASTATIC SQUAMOUS CELL CARCINOMA TO PAROTID GLAND (H): Primary | ICD-10-CM

## 2022-02-15 PROCEDURE — G0463 HOSPITAL OUTPT CLINIC VISIT: HCPCS

## 2022-02-15 NOTE — NURSING NOTE
Patient here to see ENT for cancer follow up.   Christine Baez LPN ..........2/15/2022 1:49 PM

## 2022-02-18 NOTE — PROGRESS NOTES
document embedded image  Patient Name: Dino Guzman    Address: [address] [place] [address]     YOB: 1950    [place] [address]    MR Number: [id number]    Phone: [phone]  PCP: Kuldip Lazo DO            Appointment Date: 02/15/22   Visit Provider: Los Hamilton MD    cc: Kuldip Lazo DO; ~    ENT Progress Note  Intake  Visit Reasons: Cancer Follow Up    HPI  History of Present Illness  Chief complaint:  Follow-up head neck cancer    History  The patient is a 71-year-old man who underwent right parotidectomy and right modified neck dissection for metastatic squamous cell carcinoma from a right forehead primary tumor in May of 2021.  He did undergo postoperative radiotherapy.  He has done well since.  He presents to the office today without complaint.     Exam  Head and neck integument-he has no persistent or new lesions across his forehead.  He has an actinic keratoses of his lower eyelid that bothers him on the right.  Neck-no palpable masses or adenopathy bed is clear  Oral cavity oropharynx, nasal-Clear  General-the patient appears well and in no distress  Neuro-there are no focal cranial nerve deficits aside from some lingering weakness of his forehead.    Allergies    No Known Allergies Allergy (Verified 05/10/21 08:54)    PFSH  PFSH:   Medical History (Reviewed 21 @ 10:24 by Katie Amaya, Med Assist)    Rupture of left quadriceps tendon  repair lt leg quad    Surgical History (Reviewed 21 @ 10:24 by Katie Amaya, Med Assist)    History of knee surgery  left  S/P excision of skin lesion, follow-up exam  Dr Wesley on 20, forehead lesion    Family History (Reviewed 21 @ 10:24 by Katie Amaya, Med Assist)  Father  Skin abnormalities  Mother     No problems noted.       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      Los Hamilton MD    02/15/22 0814    <Electronically signed by Los Hamilton MD> 02/16/22 1116

## 2022-03-15 ENCOUNTER — OFFICE VISIT (OUTPATIENT)
Dept: OTOLARYNGOLOGY | Facility: OTHER | Age: 72
End: 2022-03-15
Attending: OTOLARYNGOLOGY
Payer: COMMERCIAL

## 2022-03-15 DIAGNOSIS — C79.89 METASTATIC SQUAMOUS CELL CARCINOMA TO HEAD AND NECK (H): Primary | ICD-10-CM

## 2022-03-15 PROCEDURE — G0463 HOSPITAL OUTPT CLINIC VISIT: HCPCS

## 2022-03-28 NOTE — PROGRESS NOTES
document embedded image  Patient Name: Dino Guzman    Address: [address] [place] [address]     YOB: 1950    [place] [address]    MR Number: [id number]    Phone: [phone]  PCP: Kuldip Lazo DO            Appointment Date: 03/15/22   Visit Provider: Los Hamilton MD    cc: Kuldip Lazo DO; ~    ENT Progress Note  Intake  Visit Reasons: Cancer Follow Up    HPI  History of Present Illness  Chief complaint:  Follow-up head neck cancer    History  The patient is a 71-year-old man who is here today for follow-up of his squamous cell carcinoma that metastasized to his parotid and upper neck nodes after resection from his forehead.  He is in his 1st year of follow-up.  His anniversary of his surgery is in May.  He has no complaints.     Exam  Careful palpation of his neck and parotid bed failed to reveal any recurrent nodules  Head neck integument-Clear  Oral cavity oropharynx-clear  Nasal-no obstruction or purulence  General-the patient appears well and in no distress  Neuro-there are no focal cranial nerve deficits    Allergies    No Known Allergies Allergy (Verified 05/10/21 08:54)    PFSH  PFSH:   Medical History (Reviewed 21 @ 10:24 by Katie Amaya, Med Assist)    Rupture of left quadriceps tendon  repair lt leg quad    Surgical History (Reviewed 21 @ 10:24 by Katie Amaya, Med Assist)    History of knee surgery  left  S/P excision of skin lesion, follow-up exam  Dr Wesley on 20, forehead lesion    Family History (Reviewed 21 @ 10:24 by Katie Amaya Med Assist)  Father  Skin abnormalities  Mother     No problems noted.       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

## 2022-04-12 ENCOUNTER — OFFICE VISIT (OUTPATIENT)
Dept: OTOLARYNGOLOGY | Facility: OTHER | Age: 72
End: 2022-04-12
Attending: OTOLARYNGOLOGY
Payer: COMMERCIAL

## 2022-04-12 DIAGNOSIS — C79.89 METASTATIC SQUAMOUS CELL CARCINOMA TO PAROTID GLAND (H): Primary | ICD-10-CM

## 2022-04-12 PROCEDURE — G0463 HOSPITAL OUTPT CLINIC VISIT: HCPCS

## 2022-04-21 NOTE — PROGRESS NOTES
document embedded image  Patient Name: Dino Guzman    Address: [address] [place] [address]     YOB: 1950    [place] [address]    MR Number: [id number]    Phone: [phone]  PCP: Kuldip Lazo DO            Appointment Date: 22   Visit Provider: Los Hamilton MD    cc: Kuldip Lazo DO; ~    ENT Progress Note  Intake  Visit Reasons: Ca Re Ck    HPI  History of Present Illness  Chief complaint:  Follow-up head neck cancer    History  The patient is a 71-year-old man who last May underwent a right parotidectomy for metastatic squamous cell carcinoma from a skin primary.  He subsequently completed radiation therapy.  He has done beautifully.  He is here today for routine follow-up without new complaint.     Exam  Neck-no palpable masses or adenopathy.  His parotid bed is clean.  His head neck integument is without new lesion  Remainder of the head neck exam is unremarkable    Allergies    No Known Allergies Allergy (Verified 05/10/21 08:54)    PFSH  PFSH:   Medical History (Reviewed 21 @ 10:24 by Katie Amaya Med Assist)    Rupture of left quadriceps tendon  repair lt leg quad    Surgical History (Reviewed 21 @ 10:24 by Katie Amaya Med Assist)    History of knee surgery  left  S/P excision of skin lesion, follow-up exam  Dr Wesley on 20, forehead lesion    Family History (Reviewed 21 @ 10:24 by Katie Amaya, Med Assist)  Father  Skin abnormalities  Mother     No problems noted.       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      Los Hamilton MD    04/12/22 0805    <Electronically signed by Los Hamilton MD> 04/13/22 1212

## 2022-05-17 ENCOUNTER — OFFICE VISIT (OUTPATIENT)
Dept: OTOLARYNGOLOGY | Facility: OTHER | Age: 72
End: 2022-05-17
Attending: OTOLARYNGOLOGY
Payer: COMMERCIAL

## 2022-05-17 DIAGNOSIS — C79.89 SECONDARY MALIGNANT NEOPLASM OF OTHER SPECIFIED SITES (H): Primary | ICD-10-CM

## 2022-05-17 DIAGNOSIS — L98.9 DISORDER OF THE SKIN AND SUBCUTANEOUS TISSUE, UNSPECIFIED: ICD-10-CM

## 2022-05-17 PROCEDURE — G0463 HOSPITAL OUTPT CLINIC VISIT: HCPCS

## 2022-05-31 NOTE — PROGRESS NOTES
document embedded image  Patient Name: Dino Guzman    Address: [address] [place] [address]     YOB: 1950    [place] [address]    MR Number: [id number]    Phone: [phone]  PCP: Kuldip Lazo DO            Appointment Date: 05/17/22   Visit Provider: Los Hamilton MD    cc: Kuldip Lazo DO; ~    ENT Progress Note  Intake  Visit Reasons: Ca Re Ck    HPI  History of Present Illness  Chief complaint: Follow-up head neck cancer    History  The patient is a 71-year-old male who is here today completing his 1st year of follow-up after undergoing a parotidectomy for metastatic squamous cell carcinoma from a right brow lesion.  He denies any new complaints.  He has a persisting keratosis of his lower eyelid and 2 of his left brow.  Given his previous head neck skin cancer diagnosis he is anxious to have these removed.    Exam  Neck-no palpable adenopathy or parotid bed lesions noted  Head neck integument-she has the irregular keratotic lesion of his right lower eyelid and a 2nd lesion of his left brow that has been bleeding recently.  Nasal-no obstruction or purulence  Oral cavity oropharynx-free of lesions or inflammation  General-the patient appears well and in no distress  Focal cranial nerve deficits    Allergies    No Known Allergies Allergy (Verified 05/10/21 08:54)    PFSH  PFSH:   Medical History (Reviewed 05/13/21 @ 10:24 by Katie Amaya, Med Assist)    Rupture of left quadriceps tendon  repair lt leg quad    Surgical History (Reviewed 05/13/21 @ 10:24 by Katei Amaya, Med Assist)    History of knee surgery  left  S/P excision of skin lesion, follow-up exam  Dr Wesley on 12/14/20, forehead lesion    Family History (Reviewed 05/13/21 @ 10:24 by Katie Amaya, Med Assist)  Father  Skin abnormalities  Mother     No problems noted.       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  We will make arrangements for excision of his right lower eyelid and left brow skin lesions.      Los Hamilton MD    22 1547    <Electronically signed by Los Hamilton MD> 22 [id number]

## 2022-06-07 ENCOUNTER — OFFICE VISIT (OUTPATIENT)
Dept: OTOLARYNGOLOGY | Facility: OTHER | Age: 72
End: 2022-06-07
Attending: OTOLARYNGOLOGY
Payer: COMMERCIAL

## 2022-06-07 DIAGNOSIS — Z09 POSTOP CHECK: Primary | ICD-10-CM

## 2022-06-07 PROCEDURE — G0463 HOSPITAL OUTPT CLINIC VISIT: HCPCS

## 2022-06-16 NOTE — PROGRESS NOTES
document embedded image  Patient Name: Dino Guzman    Address: [address] [place] [address]     YOB: 1950    [place] [address]    MR Number: [id number]    Phone: [phone]  PCP: Kuldip Lazo DO            Appointment Date: 22   Visit Provider: Los Hamilton MD    cc: Kuldip Lazo DO; ~    ENT Progress Note  Intake  Visit Reasons: PO Facial Skin Lesion    HPI  History of Present Illness  Chief complaint:  Postop check    History  The patient is a 71-year-old man who had several facial lesions excised and biopsied.  All were benign at this time.  He is had no wound problems.     Exam  Some residual sutures are removed from his right lower eyelid, his left brow, and left hairline.  All wounds are healing well.    Allergies    No Known Allergies Allergy (Verified 22 11:59)    PFSH  PFSH:   Past Medical History: (Reviewed 22 @ 09:13 by Danielle Perdomo MD)    Rupture of left quadriceps tendon  repair lt leg quad    Past Surgical History: (Reviewed 22 @ 09:13 by Danielle Perdomo MD)    History of knee surgery  left  S/P excision of skin lesion, follow-up exam  Dr Wesley on 20, forehead lesion    Family History: (Reviewed 22 @ 09:13 by Danielle Perdomo MD)  Father  Skin abnormalities  Mother     No problems noted.    Son     No problems noted.    Daughter     No problems noted.       Social History: (Reviewed 22 @ 09:13 by Danielle Perdomo MD)  Smoking Status:  Former smoker  how long ago did patient quit smokin yrs- quit before age 42  second hand exposure:  No  alcohol intake:  current alcohol intake frequency: other Alcohol type: beer, wine and hard liquor  substance use type:  does not use  housing:  house       A&P  Assessment & Plan  (1) Postop check:        Status: Acute        Code(s):  Z09 - Encounter for follow-up examination after completed treatment for conditions other than malignant neoplasm  He will resume his routine post tumor treatment  surveillance visits.      Los Hamilton MD    06/06/22 1426    <Electronically signed by Los Hamilton MD> 06/08/22 9558

## 2022-07-12 ENCOUNTER — OFFICE VISIT (OUTPATIENT)
Dept: OTOLARYNGOLOGY | Facility: OTHER | Age: 72
End: 2022-07-12
Attending: OTOLARYNGOLOGY
Payer: COMMERCIAL

## 2022-07-12 DIAGNOSIS — C79.89 METASTATIC SQUAMOUS CELL CARCINOMA TO PAROTID GLAND (H): Primary | ICD-10-CM

## 2022-07-12 PROCEDURE — G0463 HOSPITAL OUTPT CLINIC VISIT: HCPCS

## 2022-07-21 NOTE — PROGRESS NOTES
document embedded image  Patient Name: Dino Guzman    Address: [address] [place] [address]     YOB: 1950    [place] [address]    MR Number: [id number]    Phone: [phone]  PCP: Kuldip Lazo DO            Appointment Date: 22   Visit Provider: Los Hamilton MD    cc: Kuldip aLzo DO; ~    ENT Progress Note  Intake  Visit Reasons: Cancer follow up    HPI  History of Present Illness    Chief complaint:  Follow-up head and neck cancer    History  The patient is a 72-year-old man who underwent parotidectomy and neck dissection on the right for metastatic squamous cell carcinoma from a skin primary in May of 2021.  He returns to the office today for routine follow-up.  He has no new complaints.    Exam   head and neck integument is clear  Neck-no palpable masses in the parotid bed or neck lymph nodes.  Oral cavity oropharynx, nasal -Clear  General patient appears well and in no distress  Neuro are no focal cranial nerve deficits    Allergies    No Known Allergies Allergy (Verified 22 11:59)    PFSH  PFSH:   Past Medical History: (Reviewed 22 @ 09:13 by Danielle Perdomo MD)    Rupture of left quadriceps tendon  repair lt leg quad    Past Surgical History: (Reviewed 22 @ 09:13 by Danielle Perdomo MD)    History of knee surgery  left  S/P excision of skin lesion, follow-up exam  Dr Wesley on 20, forehead lesion    Family History: (Reviewed 22 @ 09:13 by Danielle Perdomo MD)  Father  Skin abnormalities  Mother     No problems noted.    Son     No problems noted.    Daughter     No problems noted.       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  He should continue his every other month follow-up visits until May.  He did have some concerns regarding his hearing and possible hearing aids.  He will schedule an audiogram at his next visit.      Los Hamilton MD    07/12/22 0855    <Electronically signed by Los Hamilton MD> 07/14/22 1231

## 2022-09-20 ENCOUNTER — OFFICE VISIT (OUTPATIENT)
Dept: OTOLARYNGOLOGY | Facility: OTHER | Age: 72
End: 2022-09-20
Attending: OTOLARYNGOLOGY
Payer: COMMERCIAL

## 2022-09-20 DIAGNOSIS — C79.89 SECONDARY MALIGNANT NEOPLASM OF OTHER SPECIFIED SITES (H): Primary | ICD-10-CM

## 2022-09-20 PROCEDURE — G0463 HOSPITAL OUTPT CLINIC VISIT: HCPCS

## 2022-09-20 NOTE — NURSING NOTE
Patient here to see ENT for cancer follow up.   Christine Baez LPN ..........9/20/2022 1:02 PM

## 2022-09-26 NOTE — PROGRESS NOTES
document embedded image  Patient Name: Dino Guzman    Address: [address] [place] [address]     YOB: 1950    [place] [address]    MR Number: [id number]    Phone: [phone]  PCP: Kuldip Lazo DO            Appointment Date: 22   Visit Provider: Los Hamilton MD    cc: Kuldip Lazo DO; ~    ENT Progress Note  Intake  Visit Reasons: Cancer check    HPI  History of Present Illness  Chief complaint:  Follow-up head neck cancer    History  The patient is a 72-year-old man who had undergone right parotidectomy for metastatic squamous cell carcinoma from a skin primary from his forehead.  This was performed in May of 2021.  He did complete postoperative radiation therapy as well.  He is done well since and presents today for routine follow-up without complaint.     Exam  Head neck integument-no visible least time  Neck-no palpable masses or adenopathy.  His parotid bed is free of nodule.   Nasal-no obstruction or purulence  Oral cavity oropharynx-free of lesions or inflammation  General-the patient appears well and in no distress  Neuro-there are no focal cranial nerve deficits    Allergies    No Known Allergies Allergy (Verified 22 11:59)    PFSH  PFSH:   Past Medical History: (Reviewed 22 @ 09:13 by Danielle Perdomo MD)    Rupture of left quadriceps tendon  repair lt leg quad    Past Surgical History: (Reviewed 22 @ 09:13 by Danielle Perdomo MD)    History of knee surgery  left  S/P excision of skin lesion, follow-up exam  Dr Wesley on 20, forehead lesion    Family History: (Reviewed 22 @ 09:13 by Danielle Perdomo MD)  Father  Skin abnormalities  Mother     No problems noted.    Son     No problems noted.    Daughter     No problems noted.       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      Los Hamilton MD    09/20/22 1286    <Electronically signed by Los Hamilton MD> 09/21/22 2497

## 2022-09-28 NOTE — NURSING NOTE
Patient here to see ENT for cancer follow up  Christine Baez LPN ..........7/12/2022 2:05 PM      
Previously Declined (within the last year)

## 2022-11-15 ENCOUNTER — OFFICE VISIT (OUTPATIENT)
Dept: OTOLARYNGOLOGY | Facility: OTHER | Age: 72
End: 2022-11-15
Attending: OTOLARYNGOLOGY
Payer: COMMERCIAL

## 2022-11-15 DIAGNOSIS — C79.89 METASTATIC SQUAMOUS CELL CARCINOMA TO PAROTID GLAND (H): Primary | ICD-10-CM

## 2022-11-15 PROCEDURE — G0463 HOSPITAL OUTPT CLINIC VISIT: HCPCS

## 2022-11-15 NOTE — NURSING NOTE
Patient here to see ENT for cancer follow up.   Christine Baez LPN ..........11/15/2022 2:30 PM

## 2022-11-18 NOTE — PROGRESS NOTES
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  He did complete postoperative radiation therapy to his neck.  He has done well since and presents today without new complaint.    Exam  Head neck integument-no new lesions identified  Neck-no palpable mass in the parotid bed or lymph node basins bilaterally  Nasal-no obstruction or purulence  Oral cavity oropharynx-free of lesions or inflammation  General-the patient appears well and in no distress  Neuro-there are no focal cranial nerve deficits    Allergies    No Known Allergies Allergy (Verified 22 11:59)    PFSH  PFSH:   Past Medical History: (Reviewed 22 @ 09:13 by Danielle Perdomo MD)    Rupture of left quadriceps tendon  repair lt leg quad    Past Surgical History: (Reviewed 22 @ 09:13 by Danielle Perdomo MD)    History of knee surgery  left  S/P excision of skin lesion, follow-up exam  Dr Wesley on 20, forehead lesion    Family History: (Reviewed 22 @ 09:13 by Danielle Perdomo MD)  Father  Skin abnormalities  Mother     No problems noted.    Son     No problems noted.    Daughter     No problems noted.       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  We will continue every other month follow-up visits until May.      Los [place] MD    11/15/22 0828    <Electronically signed by Los [place] MD> 11/16/22 1016

## 2023-01-10 ENCOUNTER — OFFICE VISIT (OUTPATIENT)
Dept: OTOLARYNGOLOGY | Facility: OTHER | Age: 73
End: 2023-01-10
Attending: OTOLARYNGOLOGY
Payer: COMMERCIAL

## 2023-01-10 DIAGNOSIS — C79.89 METASTATIC SQUAMOUS CELL CARCINOMA TO PAROTID GLAND (H): Primary | ICD-10-CM

## 2023-01-10 PROCEDURE — G0463 HOSPITAL OUTPT CLINIC VISIT: HCPCS

## 2023-01-18 NOTE — PROGRESS NOTES
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  He did undergo postoperative radiation therapy as well.  He is done very well since.  He presents today for routine follow-up without new complaint.     Exam  Neck-no palpable nodules in the parotid bed or cervical lymph node beds.  Oral cavity oropharynx-no shift in the parapharyngeal space noted.  No mucosal lesions noted  Nasal-no obstruction or purulence  Head neck integument-Clear at this time  General-the patient appears well and in no distress  Neuro-there are no focal cranial nerve deficits    Allergies    No Known Allergies Allergy (Verified 22 11:59)    PFSH  PFSH:   Past Medical History: (Reviewed 22 @ 09:13 by Danielle Perdomo MD)    Rupture of left quadriceps tendon  repair lt leg quad    Past Surgical History: (Reviewed 22 @ 09:13 by Danielle Perdomo MD)    History of knee surgery  left  S/P excision of skin lesion, follow-up exam  Dr Wesley on 20, forehead lesion    Family History: (Reviewed 22 @ 09:13 by Danielle Perdomo MD)  Father  Skin abnormalities  Mother     No problems noted.    Son     No problems noted.    Daughter     No problems noted.       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      Los [place] MD    01/10/23 0823    <Electronically signed by Los [place] MD> 01/11/23 1141

## 2023-03-14 ENCOUNTER — OFFICE VISIT (OUTPATIENT)
Dept: OTOLARYNGOLOGY | Facility: OTHER | Age: 73
End: 2023-03-14
Attending: OTOLARYNGOLOGY
Payer: COMMERCIAL

## 2023-03-14 DIAGNOSIS — C79.89 METASTATIC SQUAMOUS CELL CARCINOMA TO PAROTID GLAND (H): Primary | ICD-10-CM

## 2023-03-14 PROCEDURE — G0463 HOSPITAL OUTPT CLINIC VISIT: HCPCS

## 2023-03-17 NOTE — PROGRESS NOTES
document embedded image  Patient Name: Dino Guzman    Address: [address] [place] [address]     YOB: 1950    [place] [id number]    MR Number: [id number]    Phone: [phone]  PCP: Kuldip Lazo DO            Appointment Date: 03/14/23   Visit Provider: Los [place] MD    cc: Kuldip Lazo DO; ~    ENT Progress Note  Intake  Visit Reasons: Cancer check    HPI  History of Present Illness  Chief complaint:  Follow-up head neck cancer    History  The patient is a 72-year-old man who in May of 2021 underwent a right parotidectomy for metastatic squamous cell carcinoma from a right forehead lesion that had previously been excised and radiated.  He did undergo post parotidectomy radiation therapy to his neck.  He has been very regular and follow-up.  He presents today without new complaint or concern.    Exam  Neck-no palpable masses or adenopathy.  His parotid bed is clear.   Head neck integument-his forehead is well healed with minimal scarring and no evidence of disease  Oral cavity oropharynx, nasal-Clear  General-the patient appears well and in no distress  Neuro-there are no focal cranial nerve deficits    Allergies    No Known Allergies Allergy (Verified 05/23/22 11:59)    PFSH  PFSH:   Past Medical History: (Reviewed 05/24/22 @ 09:13 by Danielle Perdomo MD)    Rupture of left quadriceps tendon  repair lt leg quad    Past Surgical History: (Reviewed 05/24/22 @ 09:13 by Danielle Perdomo MD)    History of knee surgery  left  S/P excision of skin lesion, follow-up exam  Dr Wesley on 12/14/20, forehead lesion    Family History: (Reviewed 05/24/22 @ 09:13 by Danielle Perdomo MD)  Father  Skin abnormalities  Mother     No problems noted.    Son     No problems noted.    Daughter     No problems noted.       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               Los [place] MD    03/14/23 0758    <Electronically signed by Los [place] MD> 03/15/23 1129

## 2023-05-16 ENCOUNTER — OFFICE VISIT (OUTPATIENT)
Dept: OTOLARYNGOLOGY | Facility: OTHER | Age: 73
End: 2023-05-16
Attending: OTOLARYNGOLOGY
Payer: COMMERCIAL

## 2023-05-16 DIAGNOSIS — C79.89 METASTATIC SQUAMOUS CELL CARCINOMA TO PAROTID GLAND (H): Primary | ICD-10-CM

## 2023-05-16 PROCEDURE — G0463 HOSPITAL OUTPT CLINIC VISIT: HCPCS

## 2023-05-16 NOTE — NURSING NOTE
Patient here to see ENT for cancer follow up.  Christine Baez LPN ..........5/16/2023 2:29 PM

## 2023-05-19 NOTE — PROGRESS NOTES
document embedded image  Patient Name: Dino Guzman    Address: [address] [place] [address]     YOB: 1950    [place] [address]    MR Number: [id number]    Phone: [phone]  PCP: Kuldip Lazo DO            Appointment Date: 05/16/23   Visit Provider: Los [place] MD    cc: Kuldip Lazo DO; ~    ENT Progress Note  Intake  Visit Reasons: Cancer follow up    HPI  History of Present Illness  Chief complaint:  Follow-up head neck cancer    History  The patient is a 72-year-old man who underwent right parotidectomy in May of 2021 for metastatic squamous cell carcinoma from a skin primary.  He did receive radiation therapy.  He is done beautifully since.  He has no new complaints at this time.     Exam  Neck-no palpable nodules in the parotid bed but there is no palpable cervical adenopathy.   Head neck integument is clear  Remainder of the head neck exam is unremarkable    Allergies    No Known Allergies Allergy (Verified 05/23/22 11:59)    PFSH  PFSH:   Past Medical History: (Reviewed 05/24/22 @ 09:13 by Danielle Perdomo MD)    Rupture of left quadriceps tendon  repair lt leg quad    Past Surgical History: (Reviewed 05/24/22 @ 09:13 by Danielle Perdomo MD)    History of knee surgery  left  S/P excision of skin lesion, follow-up exam  Dr Wesley on 12/14/20, forehead lesion    Family History: (Reviewed 05/24/22 @ 09:13 by Danielle Perdomo MD)  Father  Skin abnormalities  Mother     No problems noted.    Son     No problems noted.    Daughter     No problems noted.       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               Los [place] MD    Filed: 05/17/23 1107    <Electronically signed by Los [place] MD> 05/17/23 2168

## 2023-08-08 ENCOUNTER — OFFICE VISIT (OUTPATIENT)
Dept: OTOLARYNGOLOGY | Facility: OTHER | Age: 73
End: 2023-08-08
Attending: OTOLARYNGOLOGY
Payer: COMMERCIAL

## 2023-08-08 DIAGNOSIS — C79.89 METASTATIC SQUAMOUS CELL CARCINOMA TO PAROTID GLAND (H): Primary | ICD-10-CM

## 2023-08-08 PROCEDURE — G0463 HOSPITAL OUTPT CLINIC VISIT: HCPCS

## 2023-08-11 NOTE — PROGRESS NOTES
document embedded image  Patient Name: Dino Guzman    Address: [address] [place] [address]     YOB: 1950    [place] [address]    MR Number: [id number]    Phone: [phone]  PCP: Kuldip Lazo DO            Appointment Date: 08/08/23   Visit Provider: Los [place] MD    cc: Kuldip Lazo DO; ~    ENT Progress Note  Intake  Visit Reasons: Cancer f/u    HPI  History of Present Illness  Chief complaint:  Follow-up head neck cancer    History  The patient is a 73-year-old man who in May of 2021 underwent right parotidectomy for what turned out to be metastatic squamous cell carcinoma from a forehead skin primary.  He was treated with postop radiation therapy.  He is done beautifully.  He returned to the office today without new complaint.     Exam  Neck-there is no palpable lymph nodes or parotid bed nodes.  Head and neck integument-Clear  Remainder of the head neck exam is unremarkable    Allergies    No Known Allergies Allergy (Verified 05/23/22 11:59)    PFSH  PFSH:   Past Medical History: (Reviewed 05/24/22 @ 09:13 by Danielle Perdomo MD)    Rupture of left quadriceps tendon  repair lt leg quad    Past Surgical History: (Reviewed 05/24/22 @ 09:13 by Danielle Perdomo MD)    History of knee surgery  left  S/P excision of skin lesion, follow-up exam  Dr Wesley on 12/14/20, forehead lesion    Family History: (Reviewed 05/24/22 @ 09:13 by Danielle Perdomo MD)  Father  Skin abnormalities  Mother     No problems noted.    Son     No problems noted.    Daughter     No problems noted.       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               Los [place] MD    Filed: 08/09/23 1105    <Electronically signed by Los [place] MD> 08/09/23 1211

## 2023-11-14 ENCOUNTER — OFFICE VISIT (OUTPATIENT)
Dept: OTOLARYNGOLOGY | Facility: OTHER | Age: 73
End: 2023-11-14
Payer: COMMERCIAL

## 2023-11-14 DIAGNOSIS — C79.89 METASTATIC SQUAMOUS CELL CARCINOMA TO PAROTID GLAND (H): Primary | ICD-10-CM

## 2023-11-14 PROCEDURE — G0463 HOSPITAL OUTPT CLINIC VISIT: HCPCS

## 2023-11-14 NOTE — PROGRESS NOTES
document embedded image  Patient Name: Dino Guzman    Address: [address] [place] [address]     YOB: 1950    [place] [id number]    MR Number: [id number]    Phone: [phone]  PCP: Kuldip Lazo DO            Appointment Date: 11/14/23   Visit Provider: Los [place] MD    cc: Kuldip Lazo DO; ~    ENT Progress Note  Intake  Visit Reasons: Cancer follow up    HPI  History of Present Illness  Chief complaint:  Follow-up head neck cancer    History  The patient is a 73-year-old man who in May of 2021 underwent right parotidectomy for a metastatic squamous cell carcinoma from a skin primary in his forehead.  He did undergo post operative radiation therapy to his neck.  He is done well since.  He is here today for routine follow-up without new complaint.     Exam  His parotid bed is without palpable nodule.  His neck is without any palpable adenopathy.   Oral cavity oropharynx-there is no asymmetry of his parapharyngeal spaces  Remainder of the head neck exam is unremarkable  Integument-I see no evidence of recurrent or new suspicious skin lesions    Allergies    No Known Allergies Allergy (Verified 08/29/23 14:50)    PFSH  PFSH:   Past Medical History: (Reviewed 08/29/23 @ 14:50 by Nidia Lopez, Med Assist)    Rupture of left quadriceps tendon  repair lt leg quad    Past Surgical History: (Reviewed 08/29/23 @ 14:50 by Nidia Lopez, Med Assist)    History of knee surgery  left  S/P excision of skin lesion, follow-up exam  Dr Wesley on 12/14/20, forehead lesion    Family History: (Reviewed 08/29/23 @ 14:50 by Nidia Lopez Med Assist)  Father  Skin abnormalities  Mother     No problems noted.    Son     No problems noted.    Daughter     No problems noted.       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               Los [place] MD    Filed: 11/16/23 1103    <Electronically signed by Los [place] MD> 11/16/23 6426

## 2024-02-20 ENCOUNTER — OFFICE VISIT (OUTPATIENT)
Dept: OTOLARYNGOLOGY | Facility: OTHER | Age: 74
End: 2024-02-20
Payer: COMMERCIAL

## 2024-02-20 DIAGNOSIS — C79.89 METASTATIC SQUAMOUS CELL CARCINOMA TO PAROTID GLAND (H): Primary | ICD-10-CM

## 2024-02-20 PROCEDURE — G0463 HOSPITAL OUTPT CLINIC VISIT: HCPCS

## 2024-02-27 NOTE — PROGRESS NOTES
document embedded image  Patient Name: Dino Guzman   Address: [address] [place] [address]    YOB: 1950   [place] [address]   MR Number: [id number]   Phone: [phone]  PCP: Kuldip Lazo DO           Appointment Date: 02/20/24  Visit Provider: Los [place] MD    cc: Kuldip Lazo DO; ~    ENT Progress Note    Intake  Visit Reasons: Cancer follow up    HPI  History of Present Illness  Chief complaint:  Follow-up head neck cancer     History  The patient is a 73-year-old male who in May of 2021 underwent a right parotidectomy for what ended up being metastatic squamous cell carcinoma from a forehead primary.  He did go on and received postoperative radiation therapy.  He is followed regularly since in his had no difficulties.    Exam   Neck-no palpable parotid bed lesions.  There is no palpable cervical adenopathy.    Head and neck integument-Clear at this time   Oral cavity oropharynx, nasal-Clear   General-the patient appears well and in no distress Neuro-there are no focal cranial nerve deficits    Allergies    No Known Allergies Allergy (Verified 08/29/23 14:50)    PFSH  PFSH:   Past Medical History: (Reviewed 08/29/23 @ 14:50 by Nidia Lopez, Med Assist)    Rupture of left quadriceps tendon  repair lt leg quad    Past Surgical History: (Reviewed 08/29/23 @ 14:50 by Nidia Lopez, Med Assist)    History of knee surgery  left  S/P excision of skin lesion, follow-up exam  Dr Wesley on 12/14/20, forehead lesion    Family History: (Reviewed 08/29/23 @ 14:50 by Nidia Lopez, Med Assist)  Father  Skin abnormalities  Mother     No problems noted.    Son     No problems noted.    Daughter     No problems noted.        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                Los [place] MD    Filed: 02/20/24 3947     <Electronically signed by Los [place] MD> 02/25/24 8089

## 2024-06-25 ENCOUNTER — OFFICE VISIT (OUTPATIENT)
Dept: OTOLARYNGOLOGY | Facility: OTHER | Age: 74
End: 2024-06-25
Attending: OTOLARYNGOLOGY
Payer: COMMERCIAL

## 2024-06-25 DIAGNOSIS — C79.89 METASTATIC SQUAMOUS CELL CARCINOMA TO PAROTID GLAND (H): Primary | ICD-10-CM

## 2024-06-25 PROCEDURE — G0463 HOSPITAL OUTPT CLINIC VISIT: HCPCS

## 2024-07-01 NOTE — PROGRESS NOTES
document embedded image                                        [facility]                                                                                                                                                       Patient Name: Dino Guzman   Address: [address] [place] [address]    YOB: 1950   [place] [address]   MR Number: [id number]   Phone: [phone]  PCP: Kuldip Lazo DO           Appointment Date: 06/25/24  Visit Provider: Los [place] MD    cc: ~    ENT Progress Note        Intake  Visit Reasons: Cancer follow up    HPI  History of Present Illness  Chief complaint:  Follow-up head neck cancer    History  The patient is a 74-year-old man who developed metastasis to his parotid gland from a forehead primary squamous cell carcinoma.  He he underwent parotidectomy and subsequent radiation therapy.  He is here today completing his 3rd year of follow-up.  He has no new complaints.      Exam   His head and neck integument fails to reveal obvious suspicious lesion at this time.  He admits he has had a couple of lesions frozen from the right side of his face since last being seen.  Neck-no palpable nodules in the parotid bed or neck  The remainder of the head neck exam is unremarkable    Allergies    No Known Allergies Allergy (Verified 04/30/24 13:54)    PFSH  PFSH:   Past Medical History: (Reviewed 04/30/24 @ 13:55 by Raquel Barlow, Med Assist)    Rupture of left quadriceps tendon  repair lt leg quad    Past Surgical History: (Reviewed 04/30/24 @ 13:55 by Raquel Barlow, Med Assist)    History of knee surgery  left  S/P excision of skin lesion, follow-up exam  Dr Wesley on 12/14/20, forehead lesion    Family History: (Reviewed 04/30/24 @ 13:55 by Raquel Barlow, Med Assist)  Father  Skin abnormalities  Mother     No problems noted.    Son     No problems noted.    Daughter     No problems noted.        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                Los [place] MD    Filed: 06/26/24 1414      <Electronically signed by Los [place] MD> 06/26/24 1413

## 2025-06-17 ENCOUNTER — OFFICE VISIT (OUTPATIENT)
Dept: OTOLARYNGOLOGY | Facility: OTHER | Age: 75
End: 2025-06-17
Attending: OTOLARYNGOLOGY
Payer: COMMERCIAL

## 2025-06-17 DIAGNOSIS — C44.300 MALIGNANT NEOPLASM OF SKIN OF FACE: Primary | ICD-10-CM

## 2025-06-17 PROCEDURE — G0463 HOSPITAL OUTPT CLINIC VISIT: HCPCS

## 2025-06-17 NOTE — NURSING NOTE
"Chief Complaint   Patient presents with    Cancer Check    Patient presents to the clinic today for a cancer check.     Initial There were no vitals taken for this visit. Estimated body mass index is 35.36 kg/m  as calculated from the following:    Height as of 10/20/21: 1.702 m (5' 7\").    Weight as of 10/20/21: 102.4 kg (225 lb 12 oz).  Medication Review: complete    The next two questions are to help us understand your food security.  If you are feeling you need any assistance in this area, we have resources available to support you today.           No data to display                Yamil Michael"

## 2025-06-30 NOTE — PROGRESS NOTES
Office Visit  6/17/2025  [facility] - [place]     [place] M.D.  Otolaryngology Cancer of skin, face  Dx Follow-up; Referred by Los [place] M.D.  Reason for Visit     Progress Notes  Los [place] M.D. (Physician)  Otolaryngology  Chief complaint:  Follow up head neck cancer      History  The patient is a 74-year-old man who is here today completing 4 years of follow up after undergoing a right parotidectomy and subsequent radiation therapy for squamous cell carcinoma metastatic from a skin primary in his forehead.  He presents today having noticed a new lesion developing in his right temporal region.     Exam   He does have a lesion in his right temporal region that measures less than a centimeter in greatest dimension.  It has the gross appearance of the cutaneous malignancy.  Neck-there is no palpable masses in his parotid bed or neck  The remainder of the head neck exam is unremarkable      Impression   Suspicious skin lesion right temporal region     Plan   We will make arrangements for a trip today surgery for wide excision with frozen section control of margins given his previous aggressive skin malignancy.

## 2025-08-19 ENCOUNTER — OFFICE VISIT (OUTPATIENT)
Dept: OTOLARYNGOLOGY | Facility: OTHER | Age: 75
End: 2025-08-19
Attending: OTOLARYNGOLOGY
Payer: COMMERCIAL